# Patient Record
Sex: MALE | Race: WHITE | NOT HISPANIC OR LATINO | URBAN - METROPOLITAN AREA
[De-identification: names, ages, dates, MRNs, and addresses within clinical notes are randomized per-mention and may not be internally consistent; named-entity substitution may affect disease eponyms.]

---

## 2017-01-04 ENCOUNTER — APPOINTMENT (RX ONLY)
Dept: URBAN - METROPOLITAN AREA CLINIC 24 | Facility: CLINIC | Age: 52
Setting detail: DERMATOLOGY
End: 2017-01-04

## 2017-01-04 DIAGNOSIS — Z48.02 ENCOUNTER FOR REMOVAL OF SUTURES: ICD-10-CM

## 2017-01-04 PROCEDURE — ? OTHER

## 2017-01-04 PROCEDURE — ? SUTURE REMOVAL (GLOBAL PERIOD)

## 2017-01-04 ASSESSMENT — LOCATION ZONE DERM: LOCATION ZONE: ARM

## 2017-01-04 ASSESSMENT — LOCATION SIMPLE DESCRIPTION DERM: LOCATION SIMPLE: LEFT SHOULDER

## 2017-01-04 ASSESSMENT — LOCATION DETAILED DESCRIPTION DERM: LOCATION DETAILED: LEFT POSTERIOR SHOULDER

## 2017-01-04 NOTE — PROCEDURE: SUTURE REMOVAL (GLOBAL PERIOD)
Detail Level: Detailed
Add 34685 Cpt? (Important Note: In 2017 The Use Of 01871 Is Being Tracked By Cms To Determine Future Global Period Reimbursement For Global Periods): no

## 2017-01-04 NOTE — PROCEDURE: OTHER
Detail Level: Zone
Other (Free Text): Suture removed by assistant. I was available in the office but did not examine the patient.
Note Text (......Xxx Chief Complaint.): This diagnosis correlates with the

## 2017-01-26 ENCOUNTER — APPOINTMENT (RX ONLY)
Dept: URBAN - METROPOLITAN AREA CLINIC 24 | Facility: CLINIC | Age: 52
Setting detail: DERMATOLOGY
End: 2017-01-26

## 2017-01-26 DIAGNOSIS — L73.8 OTHER SPECIFIED FOLLICULAR DISORDERS: ICD-10-CM

## 2017-01-26 DIAGNOSIS — L82.1 OTHER SEBORRHEIC KERATOSIS: ICD-10-CM

## 2017-01-26 DIAGNOSIS — L91.8 OTHER HYPERTROPHIC DISORDERS OF THE SKIN: ICD-10-CM

## 2017-01-26 PROBLEM — E78.5 HYPERLIPIDEMIA, UNSPECIFIED: Status: ACTIVE | Noted: 2017-01-26

## 2017-01-26 PROBLEM — J30.1 ALLERGIC RHINITIS DUE TO POLLEN: Status: ACTIVE | Noted: 2017-01-26

## 2017-01-26 PROCEDURE — ? SKIN TAG REMOVAL

## 2017-01-26 PROCEDURE — 11200 RMVL SKIN TAGS UP TO&INC 15: CPT

## 2017-01-26 PROCEDURE — 99212 OFFICE O/P EST SF 10 MIN: CPT | Mod: 25

## 2017-01-26 PROCEDURE — ? OTHER

## 2017-01-26 PROCEDURE — ? COUNSELING

## 2017-01-26 ASSESSMENT — LOCATION DETAILED DESCRIPTION DERM
LOCATION DETAILED: LEFT SUPERIOR CENTRAL MALAR CHEEK
LOCATION DETAILED: LEFT SUPERIOR MEDIAL MALAR CHEEK
LOCATION DETAILED: LEFT AXILLARY VAULT
LOCATION DETAILED: LEFT INFERIOR LID MARGIN

## 2017-01-26 ASSESSMENT — LOCATION SIMPLE DESCRIPTION DERM
LOCATION SIMPLE: LEFT CHEEK
LOCATION SIMPLE: LEFT INFERIOR EYELID
LOCATION SIMPLE: LEFT AXILLARY VAULT

## 2017-01-26 ASSESSMENT — LOCATION ZONE DERM
LOCATION ZONE: EYELID
LOCATION ZONE: FACE
LOCATION ZONE: AXILLAE

## 2017-01-26 NOTE — PROCEDURE: SKIN TAG REMOVAL
Medical Necessity Clause: This procedure was medically necessary because the lesions that were treated were: rubbing on clothes
Anesthesia Volume In Cc: 0
Medical Necessity Information: It is in your best interest to select a reason for this procedure from the list below. All of these items fulfill various CMS LCD requirements except the new and changing color options.
Include Z78.9 (Other Specified Conditions Influencing Health Status) As An Associated Diagnosis?: No
Consent: Written consent obtained and the risks of skin tag removal was reviewed with the patient including but not limited to bleeding, pigmentary change, infection, pain, and remote possibility of scarring.
Detail Level: Detailed

## 2017-01-26 NOTE — PROCEDURE: OTHER
Detail Level: Zone
Note Text (......Xxx Chief Complaint.): This diagnosis correlates with the
Other (Free Text): Rec ophtho if pt desires removal since on lid margin

## 2017-04-06 ENCOUNTER — APPOINTMENT (RX ONLY)
Dept: URBAN - METROPOLITAN AREA CLINIC 24 | Facility: CLINIC | Age: 52
Setting detail: DERMATOLOGY
End: 2017-04-06

## 2017-04-06 DIAGNOSIS — Z87.2 PERSONAL HISTORY OF DISEASES OF THE SKIN AND SUBCUTANEOUS TISSUE: ICD-10-CM

## 2017-04-06 DIAGNOSIS — D22 MELANOCYTIC NEVI: ICD-10-CM
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
## 2017-04-06 DIAGNOSIS — L81.4 OTHER MELANIN HYPERPIGMENTATION: ICD-10-CM

## 2017-04-06 PROBLEM — L70.0 ACNE VULGARIS: Status: ACTIVE | Noted: 2017-04-06
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
## 2017-04-06 PROBLEM — D22.5 MELANOCYTIC NEVI OF TRUNK: Status: ACTIVE | Noted: 2017-04-06

## 2017-04-06 PROBLEM — D48.5 NEOPLASM OF UNCERTAIN BEHAVIOR OF SKIN: Status: ACTIVE | Noted: 2017-04-06

## 2017-04-06 PROCEDURE — ? COUNSELING

## 2017-04-06 PROCEDURE — ? MEDICAL PHOTOGRAPHY REVIEW

## 2017-04-06 PROCEDURE — ? OBSERVATION

## 2017-04-06 PROCEDURE — 11300 SHAVE SKIN LESION 0.5 CM/<: CPT | Mod: 76

## 2017-04-06 PROCEDURE — ? SHAVE REMOVAL

## 2017-04-06 PROCEDURE — 11300 SHAVE SKIN LESION 0.5 CM/<: CPT

## 2017-04-06 PROCEDURE — 99213 OFFICE O/P EST LOW 20 MIN: CPT | Mod: 25

## 2017-04-06 PROCEDURE — ? OTHER

## 2017-04-06 ASSESSMENT — LOCATION SIMPLE DESCRIPTION DERM
LOCATION SIMPLE: RIGHT LOWER BACK
LOCATION SIMPLE: LEFT SHOULDER
LOCATION SIMPLE: LEFT FOREARM
LOCATION SIMPLE: RIGHT SHOULDER
LOCATION SIMPLE: RIGHT UPPER BACK
LOCATION SIMPLE: LEFT CHEEK
LOCATION SIMPLE: NOSE
LOCATION SIMPLE: RIGHT FOREARM
LOCATION SIMPLE: ABDOMEN

## 2017-04-06 ASSESSMENT — LOCATION DETAILED DESCRIPTION DERM
LOCATION DETAILED: RIGHT MEDIAL UPPER BACK
LOCATION DETAILED: NASAL TIP
LOCATION DETAILED: RIGHT POSTERIOR SHOULDER
LOCATION DETAILED: EPIGASTRIC SKIN
LOCATION DETAILED: RIGHT SUPERIOR UPPER BACK
LOCATION DETAILED: LEFT PROXIMAL DORSAL FOREARM
LOCATION DETAILED: RIGHT SUPERIOR MEDIAL MIDBACK
LOCATION DETAILED: RIGHT PROXIMAL DORSAL FOREARM
LOCATION DETAILED: LEFT POSTERIOR SHOULDER
LOCATION DETAILED: LEFT SUPERIOR CENTRAL MALAR CHEEK

## 2017-04-06 ASSESSMENT — LOCATION ZONE DERM
LOCATION ZONE: TRUNK
LOCATION ZONE: NOSE
LOCATION ZONE: FACE
LOCATION ZONE: ARM

## 2017-04-06 NOTE — PROCEDURE: SHAVE REMOVAL
X Size Of Lesion In Cm (Optional): 0
Detail Level: Detailed
Notification Instructions: Patient will be notified of biopsy results. However, patient instructed to call the office if not contacted within 2 weeks.
Path Notes (To The Dermatopathologist): Please check margins.
Medical Necessity Clause: This procedure was medically necessary because the lesion that was rubs on shirt, irritated, and bleeding.
Anesthesia Volume In Cc: 0.5
Consent was obtained from the patient. The risks and benefits to therapy were discussed in detail. Specifically, the risks of infection, scarring, bleeding, prolonged wound healing, incomplete removal, allergy to anesthesia, nerve injury and recurrence were addressed. Prior to the procedure, the treatment site was clearly identified and confirmed by the patient. All components of Universal Protocol/PAUSE Rule completed.
Medical Necessity Information: It is in your best interest to select a reason for this procedure from the list below. All of these items fulfill various CMS LCD requirements except the new and changing color options.
Size Of Margin In Cm (Margins Are Not Added To Billing Dimensions): -
Render Post-Care Instructions In Note?: yes
Anesthesia Type: 1% lidocaine with 1:100,000 epinephrine and a 1:12 solution of 8.4% sodium bicarbonate
Bill For Surgical Tray: no
Biopsy Method: Personna blade
Size Of Lesion In Cm (Required): 0.4
Post-Care Instructions: I reviewed with the patient in detail post-care instructions. Patient is to keep the biopsy site dry overnight, and then apply vaseline twice daily until healed. Patient may apply hydrogen peroxide soaks to remove any crusting. Wound care sheet provided.
Accession #: [id number]
Wound Care: Vaseline
Hemostasis: Aluminum Chloride
Billing Type: Third-Party Bill

## 2017-04-06 NOTE — PROCEDURE: OTHER
Detail Level: Zone
Other (Free Text): Discussed that skin cancer can be life threatening. Pt still tanning but less frequently.
Other (Free Text): Recommended biopsy given irregular color, concern for malignancy. Pt declined biopsy today. Photo today and recheck 6 months. He will watch at home and call w changes.
Note Text (......Xxx Chief Complaint.): This diagnosis correlates with the

## 2017-04-06 NOTE — PROCEDURE: MEDICAL PHOTOGRAPHY REVIEW
Number Of Photographs Reviewed: 4
Review Findings: no new or changing lesions
Detail Level: Detailed

## 2018-02-27 PROBLEM — E29.1 HYPOGONADISM IN MALE: Status: ACTIVE | Noted: 2018-02-27

## 2018-09-20 PROBLEM — R79.89 ELEVATED LFTS: Status: ACTIVE | Noted: 2018-09-20

## 2019-02-04 PROBLEM — M19.011 GLENOHUMERAL ARTHRITIS, RIGHT: Status: ACTIVE | Noted: 2019-01-04

## 2019-02-04 PROBLEM — M19.012 OSTEOARTHRITIS OF LEFT GLENOHUMERAL JOINT: Status: ACTIVE | Noted: 2018-12-27

## 2019-02-12 ENCOUNTER — HOSPITAL ENCOUNTER (OUTPATIENT)
Dept: CT IMAGING | Age: 54
Discharge: HOME | End: 2019-02-12
Attending: FAMILY MEDICINE

## 2019-02-12 DIAGNOSIS — E78.5 HYPERLIPIDEMIA, UNSPECIFIED HYPERLIPIDEMIA TYPE: ICD-10-CM

## 2019-02-26 PROBLEM — R93.1 ELEVATED CORONARY ARTERY CALCIUM SCORE: Status: ACTIVE | Noted: 2019-02-26

## 2019-02-26 PROBLEM — R07.9 CHEST PAIN AT REST: Status: ACTIVE | Noted: 2019-02-26

## 2019-02-26 PROBLEM — I10 ESSENTIAL HYPERTENSION WITH GOAL BLOOD PRESSURE LESS THAN 130/85: Status: ACTIVE | Noted: 2019-02-26

## 2019-02-26 PROBLEM — Z82.49 FAMILY HISTORY OF MI (MYOCARDIAL INFARCTION): Status: ACTIVE | Noted: 2019-02-26

## 2019-02-26 PROBLEM — E78.2 MIXED HYPERLIPIDEMIA: Status: ACTIVE | Noted: 2019-02-26

## 2019-03-05 ENCOUNTER — APPOINTMENT (OUTPATIENT)
Dept: GENERAL RADIOLOGY | Age: 54
End: 2019-03-05
Attending: EMERGENCY MEDICINE
Payer: COMMERCIAL

## 2019-03-05 ENCOUNTER — HOSPITAL ENCOUNTER (EMERGENCY)
Age: 54
Discharge: ANOTHER HOSPITAL | End: 2019-03-05
Attending: EMERGENCY MEDICINE
Payer: COMMERCIAL

## 2019-03-05 ENCOUNTER — HOSPITAL ENCOUNTER (OUTPATIENT)
Age: 54
Setting detail: OBSERVATION
Discharge: HOME | End: 2019-03-06
Attending: INTERNAL MEDICINE | Admitting: INTERNAL MEDICINE
Payer: COMMERCIAL

## 2019-03-05 DIAGNOSIS — R07.9 CHEST PAIN, UNSPECIFIED TYPE: Primary | ICD-10-CM

## 2019-03-05 LAB
ALBUMIN SERPL-MCNC: 4.1 G/DL (ref 3.5–5)
ALBUMIN/GLOB SERPL: 1 {RATIO}
ALP SERPL-CCNC: 79 U/L (ref 50–136)
ALT SERPL-CCNC: 51 U/L (ref 12–65)
ANION GAP SERPL CALC-SCNC: 8 MMOL/L
AST SERPL-CCNC: 45 U/L (ref 15–37)
BASOPHILS # BLD: 0 K/UL (ref 0–0.2)
BASOPHILS NFR BLD: 1 % (ref 0–2)
BILIRUB SERPL-MCNC: 0.5 MG/DL (ref 0.2–1.1)
BUN SERPL-MCNC: 13 MG/DL (ref 6–23)
CALCIUM SERPL-MCNC: 9.8 MG/DL (ref 8.3–10.4)
CHLORIDE SERPL-SCNC: 100 MMOL/L (ref 98–107)
CO2 SERPL-SCNC: 28 MMOL/L (ref 21–32)
CREAT SERPL-MCNC: 1.06 MG/DL (ref 0.8–1.5)
D DIMER PPP FEU-MCNC: 0.3 UG/ML(FEU)
DIFFERENTIAL METHOD BLD: ABNORMAL
EOSINOPHIL # BLD: 0.2 K/UL (ref 0–0.8)
EOSINOPHIL NFR BLD: 2 % (ref 0.5–7.8)
ERYTHROCYTE [DISTWIDTH] IN BLOOD BY AUTOMATED COUNT: 14.7 % (ref 11.9–14.6)
GLOBULIN SER CALC-MCNC: 4 G/DL (ref 2.3–3.5)
GLUCOSE SERPL-MCNC: 100 MG/DL (ref 65–100)
HCT VFR BLD AUTO: 50.9 % (ref 41.1–50.3)
HGB BLD-MCNC: 16.3 G/DL (ref 13.6–17.2)
IMM GRANULOCYTES # BLD AUTO: 0 K/UL (ref 0–0.5)
IMM GRANULOCYTES NFR BLD AUTO: 0 % (ref 0–5)
LIPASE SERPL-CCNC: 167 U/L (ref 73–393)
LYMPHOCYTES # BLD: 1.6 K/UL (ref 0.5–4.6)
LYMPHOCYTES NFR BLD: 21 % (ref 13–44)
MCH RBC QN AUTO: 26.5 PG (ref 26.1–32.9)
MCHC RBC AUTO-ENTMCNC: 32 G/DL (ref 31.4–35)
MCV RBC AUTO: 82.6 FL (ref 79.6–97.8)
MONOCYTES # BLD: 0.5 K/UL (ref 0.1–1.3)
MONOCYTES NFR BLD: 6 % (ref 4–12)
NEUTS SEG # BLD: 5.2 K/UL (ref 1.7–8.2)
NEUTS SEG NFR BLD: 70 % (ref 43–78)
NRBC # BLD: 0 K/UL (ref 0–0.2)
PLATELET # BLD AUTO: 251 K/UL (ref 150–450)
PMV BLD AUTO: 8.9 FL (ref 9.4–12.3)
POTASSIUM SERPL-SCNC: 3.9 MMOL/L (ref 3.5–5.1)
PROT SERPL-MCNC: 8.1 G/DL
RBC # BLD AUTO: 6.16 M/UL (ref 4.23–5.6)
SODIUM SERPL-SCNC: 136 MMOL/L (ref 136–145)
TROPONIN I BLD-MCNC: 0 NG/ML (ref 0.02–0.05)
TROPONIN I BLD-MCNC: 0 NG/ML (ref 0.02–0.05)
TROPONIN I SERPL-MCNC: <0.02 NG/ML (ref 0.02–0.05)
WBC # BLD AUTO: 7.4 K/UL (ref 4.3–11.1)

## 2019-03-05 PROCEDURE — 71046 X-RAY EXAM CHEST 2 VIEWS: CPT

## 2019-03-05 PROCEDURE — 74011250637 HC RX REV CODE- 250/637: Performed by: EMERGENCY MEDICINE

## 2019-03-05 PROCEDURE — 65270000029 HC RM PRIVATE

## 2019-03-05 PROCEDURE — 93005 ELECTROCARDIOGRAM TRACING: CPT | Performed by: EMERGENCY MEDICINE

## 2019-03-05 PROCEDURE — 99285 EMERGENCY DEPT VISIT HI MDM: CPT | Performed by: EMERGENCY MEDICINE

## 2019-03-05 PROCEDURE — 99218 HC RM OBSERVATION: CPT

## 2019-03-05 PROCEDURE — 84484 ASSAY OF TROPONIN QUANT: CPT

## 2019-03-05 PROCEDURE — 85025 COMPLETE CBC W/AUTO DIFF WBC: CPT

## 2019-03-05 PROCEDURE — 83690 ASSAY OF LIPASE: CPT

## 2019-03-05 PROCEDURE — 80053 COMPREHEN METABOLIC PANEL: CPT

## 2019-03-05 PROCEDURE — 85379 FIBRIN DEGRADATION QUANT: CPT

## 2019-03-05 RX ORDER — NITROGLYCERIN 0.4 MG/1
0.4 TABLET SUBLINGUAL
Status: DISCONTINUED | OUTPATIENT
Start: 2019-03-05 | End: 2019-03-05 | Stop reason: HOSPADM

## 2019-03-05 RX ORDER — GUAIFENESIN 100 MG/5ML
324 LIQUID (ML) ORAL
Status: DISCONTINUED | OUTPATIENT
Start: 2019-03-05 | End: 2019-03-05 | Stop reason: HOSPADM

## 2019-03-05 RX ORDER — ASPIRIN 325 MG
325 TABLET ORAL DAILY
COMMUNITY
End: 2019-09-05

## 2019-03-05 RX ORDER — MORPHINE SULFATE 4 MG/ML
2 INJECTION, SOLUTION INTRAMUSCULAR; INTRAVENOUS
Status: DISCONTINUED | OUTPATIENT
Start: 2019-03-05 | End: 2019-03-05

## 2019-03-05 RX ADMIN — NITROGLYCERIN 0.4 MG: 0.4 TABLET, ORALLY DISINTEGRATING SUBLINGUAL at 19:19

## 2019-03-06 VITALS
BODY MASS INDEX: 31.15 KG/M2 | HEIGHT: 72 IN | SYSTOLIC BLOOD PRESSURE: 169 MMHG | RESPIRATION RATE: 20 BRPM | TEMPERATURE: 98.5 F | WEIGHT: 230 LBS | OXYGEN SATURATION: 100 % | HEART RATE: 60 BPM | DIASTOLIC BLOOD PRESSURE: 99 MMHG

## 2019-03-06 VITALS
TEMPERATURE: 97.7 F | SYSTOLIC BLOOD PRESSURE: 122 MMHG | DIASTOLIC BLOOD PRESSURE: 75 MMHG | RESPIRATION RATE: 17 BRPM | HEART RATE: 53 BPM | OXYGEN SATURATION: 95 %

## 2019-03-06 PROBLEM — F41.9 ANXIETY: Status: ACTIVE | Noted: 2019-03-06

## 2019-03-06 PROBLEM — R07.9 CHEST PAIN: Status: ACTIVE | Noted: 2019-03-06

## 2019-03-06 LAB
ATRIAL RATE: 62 BPM
CALCULATED P AXIS, ECG09: 62 DEGREES
CALCULATED R AXIS, ECG10: 5 DEGREES
CALCULATED T AXIS, ECG11: 42 DEGREES
DIAGNOSIS, 93000: NORMAL
P-R INTERVAL, ECG05: 216 MS
Q-T INTERVAL, ECG07: 404 MS
QRS DURATION, ECG06: 98 MS
QTC CALCULATION (BEZET), ECG08: 410 MS
TROPONIN I SERPL-MCNC: <0.02 NG/ML (ref 0.02–0.05)
VENTRICULAR RATE, ECG03: 62 BPM

## 2019-03-06 PROCEDURE — 74011250636 HC RX REV CODE- 250/636: Performed by: NURSE PRACTITIONER

## 2019-03-06 PROCEDURE — 93306 TTE W/DOPPLER COMPLETE: CPT

## 2019-03-06 PROCEDURE — 74011250637 HC RX REV CODE- 250/637: Performed by: NURSE PRACTITIONER

## 2019-03-06 PROCEDURE — 99218 HC RM OBSERVATION: CPT

## 2019-03-06 PROCEDURE — 36415 COLL VENOUS BLD VENIPUNCTURE: CPT

## 2019-03-06 PROCEDURE — 93351 STRESS TTE COMPLETE: CPT

## 2019-03-06 PROCEDURE — 77030020263 HC SOL INJ SOD CL0.9% LFCR 1000ML

## 2019-03-06 PROCEDURE — 84484 ASSAY OF TROPONIN QUANT: CPT

## 2019-03-06 RX ORDER — ALPRAZOLAM 0.5 MG/1
2 TABLET ORAL ONCE
Status: COMPLETED | OUTPATIENT
Start: 2019-03-06 | End: 2019-03-06

## 2019-03-06 RX ORDER — ASPIRIN 325 MG
325 TABLET ORAL ONCE
Status: COMPLETED | OUTPATIENT
Start: 2019-03-06 | End: 2019-03-06

## 2019-03-06 RX ORDER — MORPHINE SULFATE 2 MG/ML
2 INJECTION, SOLUTION INTRAMUSCULAR; INTRAVENOUS
Status: DISCONTINUED | OUTPATIENT
Start: 2019-03-06 | End: 2019-03-06 | Stop reason: HOSPADM

## 2019-03-06 RX ORDER — ALPRAZOLAM 0.5 MG/1
1 TABLET ORAL
Status: DISCONTINUED | OUTPATIENT
Start: 2019-03-06 | End: 2019-03-06 | Stop reason: HOSPADM

## 2019-03-06 RX ORDER — SODIUM CHLORIDE 9 MG/ML
75 INJECTION, SOLUTION INTRAVENOUS CONTINUOUS
Status: DISCONTINUED | OUTPATIENT
Start: 2019-03-06 | End: 2019-03-06 | Stop reason: HOSPADM

## 2019-03-06 RX ORDER — ACETAMINOPHEN 325 MG/1
650 TABLET ORAL
Status: DISCONTINUED | OUTPATIENT
Start: 2019-03-06 | End: 2019-03-06 | Stop reason: HOSPADM

## 2019-03-06 RX ORDER — NITROGLYCERIN 0.4 MG/1
0.4 TABLET SUBLINGUAL
Status: DISCONTINUED | OUTPATIENT
Start: 2019-03-06 | End: 2019-03-06 | Stop reason: HOSPADM

## 2019-03-06 RX ORDER — ALPRAZOLAM 0.5 MG/1
1 TABLET ORAL
Status: DISCONTINUED | OUTPATIENT
Start: 2019-03-06 | End: 2019-03-06

## 2019-03-06 RX ORDER — METOPROLOL TARTRATE 25 MG/1
25 TABLET, FILM COATED ORAL EVERY 12 HOURS
Status: DISCONTINUED | OUTPATIENT
Start: 2019-03-06 | End: 2019-03-06 | Stop reason: HOSPADM

## 2019-03-06 RX ORDER — SODIUM CHLORIDE 0.9 % (FLUSH) 0.9 %
5-40 SYRINGE (ML) INJECTION AS NEEDED
Status: DISCONTINUED | OUTPATIENT
Start: 2019-03-06 | End: 2019-03-06 | Stop reason: HOSPADM

## 2019-03-06 RX ORDER — HYDROCODONE BITARTRATE AND ACETAMINOPHEN 5; 325 MG/1; MG/1
1 TABLET ORAL
Status: DISCONTINUED | OUTPATIENT
Start: 2019-03-06 | End: 2019-03-06 | Stop reason: HOSPADM

## 2019-03-06 RX ADMIN — NITROGLYCERIN 1 INCH: 20 OINTMENT TOPICAL at 01:10

## 2019-03-06 RX ADMIN — ASPIRIN 325 MG: 325 TABLET ORAL at 08:38

## 2019-03-06 RX ADMIN — ALPRAZOLAM 1 MG: 0.5 TABLET ORAL at 08:38

## 2019-03-06 RX ADMIN — NITROGLYCERIN 1 INCH: 20 OINTMENT TOPICAL at 06:14

## 2019-03-06 RX ADMIN — SODIUM CHLORIDE 75 ML/HR: 900 INJECTION, SOLUTION INTRAVENOUS at 04:00

## 2019-03-06 RX ADMIN — ALPRAZOLAM 2 MG: 0.5 TABLET ORAL at 01:09

## 2019-03-06 RX ADMIN — METOPROLOL TARTRATE 25 MG: 25 TABLET ORAL at 08:39

## 2019-03-06 NOTE — ED NOTES
Pt c/o intermittent chest pain for over a year, it lasts 5-6 minutes and then resolves ,pain free on arrival and at this time

## 2019-03-06 NOTE — ED PROVIDER NOTES
Patient is a 53 yo male who presents with chest pain. States pain located in the center of his chest and radiates to his right jaw. States intermittent pain that is present today for the past 2-3 hours. States history of elevated calcium score with stress test scheduled for two weeks from now. States nausea with pain but no vomiting. No fevers or chills, no abdominal pain, no further complaints. Past Medical History:   Diagnosis Date    Chronic cough 10/2/2014    Family history of aortic aneurysm 10/2/2014    Glaucoma 2018    Hyperlipidemia     Paroxysmal tachycardia (HCC)     Post-nasal drip 10/2/2014    Varicose veins of other specified sites        Past Surgical History:   Procedure Laterality Date    HX TONSILLECTOMY           Family History:   Problem Relation Age of Onset    Bleeding Prob Father        Social History     Socioeconomic History    Marital status:      Spouse name: Not on file    Number of children: Not on file    Years of education: Not on file    Highest education level: Not on file   Social Needs    Financial resource strain: Not on file    Food insecurity - worry: Not on file    Food insecurity - inability: Not on file   [employer] needs - medical: Not on file   [employer] needs - non-medical: Not on file   Occupational History    Not on file   Tobacco Use    Smoking status: Never Smoker    Smokeless tobacco: Never Used   Substance and Sexual Activity    Alcohol use: Yes     Alcohol/week: 1.8 - 3.0 oz     Types: 3 - 5 Shots of liquor per week    Drug use: No    Sexual activity: Yes     Partners: Female   Other Topics Concern    Not on file   Social History Narrative    Not on file         ALLERGIES: Gadobutrol    Review of Systems   Constitutional: Negative for chills and fever. HENT: Negative for rhinorrhea and sore throat. Eyes: Negative for visual disturbance. Respiratory: Negative for cough and shortness of breath. Cardiovascular: Positive for chest pain. Negative for leg swelling. Gastrointestinal: Negative for abdominal pain, diarrhea, nausea and vomiting. Genitourinary: Negative for dysuria. Musculoskeletal: Negative for back pain and neck pain. Skin: Negative for rash. Neurological: Negative for weakness and headaches. Psychiatric/Behavioral: The patient is not nervous/anxious. Vitals:    03/05/19 1907   BP: (!) 184/104   Pulse: 66   Resp: 20   Temp: 98 °F (36.7 °C)   SpO2: 97%   Weight: 104.3 kg (230 lb)   Height: 6' (1.829 m)            Physical Exam   Constitutional: He is oriented to person, place, and time. He appears well-developed and well-nourished. HENT:   Head: Normocephalic. Right Ear: External ear normal.   Left Ear: External ear normal.   Eyes: Conjunctivae and EOM are normal. Pupils are equal, round, and reactive to light. Neck: Normal range of motion. Neck supple. No tracheal deviation present. Cardiovascular: Normal rate, regular rhythm, normal heart sounds and intact distal pulses. No murmur heard. Pulmonary/Chest: Effort normal and breath sounds normal. No respiratory distress. Abdominal: Soft. There is no tenderness. Musculoskeletal: Normal range of motion. Neurological: He is alert and oriented to person, place, and time. No cranial nerve deficit. Skin: No rash noted. Nursing note and vitals reviewed.        MDM  Number of Diagnoses or Management Options  Chest pain, unspecified type: new and requires workup     Amount and/or Complexity of Data Reviewed  Clinical lab tests: ordered and reviewed  Tests in the radiology section of CPT®: ordered and reviewed  Tests in the medicine section of CPT®: ordered and reviewed  Review and summarize past medical records: yes    Risk of Complications, Morbidity, and/or Mortality  Presenting problems: high  Diagnostic procedures: high  Management options: high    Patient Progress  Patient progress: stable Procedures    Recent Results (from the past 12 hour(s))   EKG, 12 LEAD, INITIAL    Collection Time: 03/05/19  6:52 PM   Result Value Ref Range    Ventricular Rate 62 BPM    Atrial Rate 62 BPM    P-R Interval 216 ms    QRS Duration 98 ms    Q-T Interval 404 ms    QTC Calculation (Bezet) 410 ms    Calculated P Axis 62 degrees    Calculated R Axis 5 degrees    Calculated T Axis 42 degrees    Diagnosis       !! AGE AND GENDER SPECIFIC ECG ANALYSIS !! Sinus rhythm with 1st degree A-V block  Cannot rule out Anterior infarct , age undetermined  Abnormal ECG  No previous ECGs available     POC TROPONIN-I    Collection Time: 03/05/19  7:03 PM   Result Value Ref Range    Troponin-I (POC) 0 (L) 0.02 - 0.05 ng/ml   CBC WITH AUTOMATED DIFF    Collection Time: 03/05/19  7:04 PM   Result Value Ref Range    WBC 7.4 4.3 - 11.1 K/uL    RBC 6.16 (H) 4.23 - 5.6 M/uL    HGB 16.3 13.6 - 17.2 g/dL    HCT 50.9 (H) 41.1 - 50.3 %    MCV 82.6 79.6 - 97.8 FL    MCH 26.5 26.1 - 32.9 PG    MCHC 32.0 31.4 - 35.0 g/dL    RDW 14.7 (H) 11.9 - 14.6 %    PLATELET 516 183 - 048 K/uL    MPV 8.9 (L) 9.4 - 12.3 FL    ABSOLUTE NRBC 0.00 0.0 - 0.2 K/uL    DF AUTOMATED      NEUTROPHILS 70 43 - 78 %    LYMPHOCYTES 21 13 - 44 %    MONOCYTES 6 4.0 - 12.0 %    EOSINOPHILS 2 0.5 - 7.8 %    BASOPHILS 1 0.0 - 2.0 %    IMMATURE GRANULOCYTES 0 0.0 - 5.0 %    ABS. NEUTROPHILS 5.2 1.7 - 8.2 K/UL    ABS. LYMPHOCYTES 1.6 0.5 - 4.6 K/UL    ABS. MONOCYTES 0.5 0.1 - 1.3 K/UL    ABS. EOSINOPHILS 0.2 0.0 - 0.8 K/UL    ABS. BASOPHILS 0.0 0.0 - 0.2 K/UL    ABS. IMM.  GRANS. 0.0 0.0 - 0.5 K/UL   METABOLIC PANEL, COMPREHENSIVE    Collection Time: 03/05/19  7:04 PM   Result Value Ref Range    Sodium 136 136 - 145 mmol/L    Potassium 3.9 3.5 - 5.1 mmol/L    Chloride 100 98 - 107 mmol/L    CO2 28 21 - 32 mmol/L    Anion gap 8 mmol/L    Glucose 100 65 - 100 mg/dL    BUN 13 6 - 23 MG/DL    Creatinine 1.06 0.8 - 1.5 MG/DL    GFR est AA >60 >60 ml/min/1.73m2    GFR est non-AA >60 ml/min/1.73m2    Calcium 9.8 8.3 - 10.4 MG/DL    Bilirubin, total 0.5 0.2 - 1.1 MG/DL    ALT (SGPT) 51 12 - 65 U/L    AST (SGOT) 45 (H) 15 - 37 U/L    Alk. phosphatase 79 50 - 136 U/L    Protein, total 8.1 g/dL    Albumin 4.1 3.5 - 5.0 g/dL    Globulin 4.0 (H) 2.3 - 3.5 g/dL    A-G Ratio 1.0     TROPONIN I    Collection Time: 03/05/19  7:04 PM   Result Value Ref Range    Troponin-I, Qt. <0.02 (L) 0.02 - 0.05 NG/ML   LIPASE    Collection Time: 03/05/19  7:04 PM   Result Value Ref Range    Lipase 167 73 - 393 U/L   D DIMER    Collection Time: 03/05/19  7:04 PM   Result Value Ref Range    D DIMER 0.30 <0.56 ug/ml(FEU)     Xr Chest Pa Lat    Result Date: 3/5/2019  Two view chest History: Acute onset moderate chest pain and dizziness Comparison: None Findings: The heart is borderline enlarged. The lungs and pleural spaces are clear. The pulmonary vascularity is within normal limits. The visualized osseous structures are unremarkable. Impression: Borderline cardiomegaly silhouette with clear lungs     Ct Heart W/o Cont With Calcium    Result Date: 2/12/2019  CT calcium scoring study INDICATION:  High cholesterol COMPARISON: None. Correlation is made to the routine chest CT 10/09/2014 Technique: Automated exposure Control was used. Multiple gated axial CT images were obtained through the chest without contrast. FINDINGS: The calcium score is 326. This places the patient in the 80th percentile rank. Limited visualization of the thorax:  The partially visualized lungs are clear. No masses or infiltrates are seen. There is no visualized adenopathy. IMPRESSION: Calcium score is 326.       51 Yo male with chest pain:    HEART score 5 for history of HTN/hyperlipidemia, known elevated calcium score and also for age and HPI. D-dimer negative so low suspicion of further acute process such as PE or even dissection. Patient discussed with cardiology for admission and further workup.

## 2019-03-06 NOTE — ED NOTES
[facility] here to transport patient to [facility] room #[id number]. Report given to paramedic.

## 2019-03-06 NOTE — PROGRESS NOTES
3/6/2019 7:06 AM    Admit Date: 3/5/2019    Admit Diagnosis: Chest pain [R07.9]      Subjective:    Patient admitted with atypical angina pattern. . Does not want cath.  Stress echo should adequately risk stratify    Objective:      Visit Vitals  /71   Pulse 70   Temp 98.1 °F (36.7 °C)   Resp 18   SpO2 96%       ROS:  General ROS: negative for - chills  Hematological and Lymphatic ROS: negative for - blood clots or jaundice  Respiratory ROS: no cough, shortness of breath, or wheezing  Cardiovascular ROS: no chest pain or dyspnea on exertion  Gastrointestinal ROS: no abdominal pain, change in bowel habits, or black or bloody stools  Neurological ROS: no TIA or stroke symptoms    Physical Exam:    Physical Examination: General appearance - alert, well appearing, and in no distress  Mental status - alert, oriented to person, place, and time  Eyes - pupils equal and reactive, extraocular eye movements intact  Neck/lymph - supple, no significant adenopathy  Chest/CV - clear to auscultation, no wheezes, rales or rhonchi, symmetric air entry  Heart - normal rate, regular rhythm, normal S1, S2, no murmurs, rubs, clicks or gallops  Abdomen/GI - soft, nontender, nondistended, no masses or organomegaly  Musculoskeletal - no joint tenderness, deformity or swelling  Extremities - peripheral pulses normal, no pedal edema, no clubbing or cyanosis  Skin - normal coloration and turgor, no rashes, no suspicious skin lesions noted    Current Facility-Administered Medications   Medication Dose Route Frequency    aspirin tablet 325 mg  325 mg Oral ONCE    ALPRAZolam (XANAX) tablet 1 mg  1 mg Oral BID PRN    sodium chloride (NS) flush 5-40 mL  5-40 mL IntraVENous PRN    nitroglycerin (NITROBID) 2 % ointment 1 Inch  1 Inch Topical Q6H    nitroglycerin (NITROSTAT) tablet 0.4 mg  0.4 mg SubLINGual Q5MIN PRN    acetaminophen (TYLENOL) tablet 650 mg  650 mg Oral Q4H PRN    HYDROcodone-acetaminophen (NORCO) 5-325 mg per tablet 1 Tab  1 Tab Oral Q4H PRN    morphine injection 2 mg  2 mg IntraVENous Q4H PRN    metoprolol tartrate (LOPRESSOR) tablet 25 mg  25 mg Oral Q12H    0.9% sodium chloride infusion  75 mL/hr IntraVENous CONTINUOUS    ezetimibe/simvastatin (VYTORIN) 10/40 mg   Oral QPM       Data Review:   @LABRCNT(Na,K,BUN,CREA,WBC,HGB,HCT,PLT,INR,TRP,TCHOL*,Triglyceride*,LDL*,LDLCPOC HDL*,HDL])@    TELEMETRY: nsr    Assessment/Plan:     Principal Problem:    Chest pain (3/6/2019) stress echo    Active Problems:    Hyperlipidemia (10/2/2014)The current medical regimen is effective;  continue present plan and medications.         Essential hypertension with goal blood pressure less than 130/85 (2/26/2019) monitor      Anxiety (3/6/2019)          Harleen Drummond MD

## 2019-03-06 NOTE — DISCHARGE SUMMARY
[facility] Cardiology Discharge Summary     Patient ID:  Mitra Orr  [id number]  52 y.o.  1965    Admit date: 3/5/2019    Discharge date and time:  3-6-2019    Admitting Physician: Jacy Cassidy MD     Discharge Physician: Bard Marci PA-C/Dr. Lemus    Admission Diagnoses: Chest pain [R07.9]    Discharge Diagnoses:   Patient Active Problem List    Diagnosis Date Noted    Chest pain 03/06/2019    Anxiety 03/06/2019    Elevated coronary artery calcium score 02/26/2019    Chest pain at rest 02/26/2019    Mixed hyperlipidemia 02/26/2019    Essential hypertension with goal blood pressure less than 130/85 02/26/2019    Family history of MI (myocardial infarction) 02/26/2019    Glenohumeral arthritis, right 01/04/2019    Osteoarthritis of left glenohumeral joint 12/27/2018    BMI 32.0-32.9,adult 09/20/2018    Elevated LFTs 09/20/2018    Hypogonadism in male 02/27/2018    Chronic cough 10/02/2014    Post-nasal drip 10/02/2014    Family history of aortic aneurysm 10/02/2014    Hyperlipidemia 10/02/2014       Cardiology Procedures this admission:  Stress Echo  Consults: None    Hospital Course: Pt is a 52 y.o. WM with h/o anxiety, HTN and HLD who presented to the ER with c/o chest pain. The intermittent chest pain was located in the center of his chest with radiation to right jaw. He sees Dr. Annika Ward in the office and was seen last week for abnormal Ca score and ongoing chest pain. The chest pain has been on and off for over a year. The episodes are sporadic and he may go 2 months without having pain. Patient states when he has an episode of chest pain it is severe and can last for a few minutes up to 2 - 3 hours. He was scheduled for a nuclear stress test in the office for next week. In the ED his BP was 184/104. EKG showed SR with NSST changes. Labs were unremarkable with troponin level negative x 2.    In the ED he received morphine,  mg, and SL NTG x 1 with improvement of symptoms. He was transferred to [facility] for further treatment. On arrival to [facility], his BP was still elevated at 164/94. On exam, he was CP free but appeared very anxious. He was admitted with plan for LHC to evaluate for ischemia. The following morning, he told Dr. Smith Husbands he didn't want a LHC and a stress echo was planned. He had stress echo w/o complication that showed normal perfusion and Dr. Luis Graham felt Pt was stable for DC home to f/u PCP and Dr. Gustavo Harrington. Pt tolerated the procedure well and was taken to the telemetry floor for recovery. Pt was up feeling well without any complaints of CP, SOB or palpitations. Pt's labs were WNL. Pt was seen and examined by Dr. Luis Graham and determined stable and ready for discharge. DISPOSITION: The patient is being discharged home in stable condition on a low saturated fat, low cholesterol and low salt diet. Pt is instructed to advance activities as tolerated limited to fatigue or shortness of breath. Pt is instructed to call office or return to ER for immediate evaluation of any shortness of breath or chest pain. Follow up with [facility] Cardiology Dr. Gustavo Harrington in 2-4 weeks  Follow up with PCP Dr. Emma Khan in 1-2 weeks    Discharge Exam:   Visit Vitals  /75 (BP 1 Location: Left arm, BP Patient Position: At rest)   Pulse (!) 53   Temp 97.7 °F (36.5 °C)   Resp 17   SpO2 95%    Pt has been seen by Dr. Luis Graham: see his progress note for exam details. Recent Results (from the past 24 hour(s))   EKG, 12 LEAD, INITIAL    Collection Time: 03/05/19  6:52 PM   Result Value Ref Range    Ventricular Rate 62 BPM    Atrial Rate 62 BPM    P-R Interval 216 ms    QRS Duration 98 ms    Q-T Interval 404 ms    QTC Calculation (Bezet) 410 ms    Calculated P Axis 62 degrees    Calculated R Axis 5 degrees    Calculated T Axis 42 degrees    Diagnosis       !! AGE AND GENDER SPECIFIC ECG ANALYSIS !!   Sinus rhythm with 1st degree A-V block  Cannot rule out Anterior infarct , age undetermined  Abnormal ECG  No previous ECGs available  Confirmed by CHETAN TO (), Melanie Kaur ([id number]) on 3/6/2019 6:13:42 AM     POC TROPONIN-I    Collection Time: 03/05/19  7:03 PM   Result Value Ref Range    Troponin-I (POC) 0 (L) 0.02 - 0.05 ng/ml   CBC WITH AUTOMATED DIFF    Collection Time: 03/05/19  7:04 PM   Result Value Ref Range    WBC 7.4 4.3 - 11.1 K/uL    RBC 6.16 (H) 4.23 - 5.6 M/uL    HGB 16.3 13.6 - 17.2 g/dL    HCT 50.9 (H) 41.1 - 50.3 %    MCV 82.6 79.6 - 97.8 FL    MCH 26.5 26.1 - 32.9 PG    MCHC 32.0 31.4 - 35.0 g/dL    RDW 14.7 (H) 11.9 - 14.6 %    PLATELET 978 022 - 331 K/uL    MPV 8.9 (L) 9.4 - 12.3 FL    ABSOLUTE NRBC 0.00 0.0 - 0.2 K/uL    DF AUTOMATED      NEUTROPHILS 70 43 - 78 %    LYMPHOCYTES 21 13 - 44 %    MONOCYTES 6 4.0 - 12.0 %    EOSINOPHILS 2 0.5 - 7.8 %    BASOPHILS 1 0.0 - 2.0 %    IMMATURE GRANULOCYTES 0 0.0 - 5.0 %    ABS. NEUTROPHILS 5.2 1.7 - 8.2 K/UL    ABS. LYMPHOCYTES 1.6 0.5 - 4.6 K/UL    ABS. MONOCYTES 0.5 0.1 - 1.3 K/UL    ABS. EOSINOPHILS 0.2 0.0 - 0.8 K/UL    ABS. BASOPHILS 0.0 0.0 - 0.2 K/UL    ABS. IMM. GRANS. 0.0 0.0 - 0.5 K/UL   METABOLIC PANEL, COMPREHENSIVE    Collection Time: 03/05/19  7:04 PM   Result Value Ref Range    Sodium 136 136 - 145 mmol/L    Potassium 3.9 3.5 - 5.1 mmol/L    Chloride 100 98 - 107 mmol/L    CO2 28 21 - 32 mmol/L    Anion gap 8 mmol/L    Glucose 100 65 - 100 mg/dL    BUN 13 6 - 23 MG/DL    Creatinine 1.06 0.8 - 1.5 MG/DL    GFR est AA >60 >60 ml/min/1.73m2    GFR est non-AA >60 ml/min/1.73m2    Calcium 9.8 8.3 - 10.4 MG/DL    Bilirubin, total 0.5 0.2 - 1.1 MG/DL    ALT (SGPT) 51 12 - 65 U/L    AST (SGOT) 45 (H) 15 - 37 U/L    Alk.  phosphatase 79 50 - 136 U/L    Protein, total 8.1 g/dL    Albumin 4.1 3.5 - 5.0 g/dL    Globulin 4.0 (H) 2.3 - 3.5 g/dL    A-G Ratio 1.0     TROPONIN I    Collection Time: 03/05/19  7:04 PM   Result Value Ref Range    Troponin-I, Qt. <0.02 (L) 0.02 - 0.05 NG/ML   LIPASE    Collection Time: 03/05/19  7:04 PM Result Value Ref Range    Lipase 167 73 - 393 U/L   D DIMER    Collection Time: 03/05/19  7:04 PM   Result Value Ref Range    D DIMER 0.30 <0.56 ug/ml(FEU)   POC TROPONIN-I    Collection Time: 03/05/19 10:04 PM   Result Value Ref Range    Troponin-I (POC) 0 (L) 0.02 - 0.05 ng/ml   TROPONIN I    Collection Time: 03/06/19  2:31 AM   Result Value Ref Range    Troponin-I, Qt. <0.02 (L) 0.02 - 0.05 NG/ML         Patient Instructions:   Current Discharge Medication List      CONTINUE these medications which have NOT CHANGED    Details   aspirin (ASPIRIN) 325 mg tablet Take 325 mg by mouth daily. vitamin k 100 mcg tablet Take 200 mcg by mouth daily. testosterone (ANDROGEL) 20.25 mg/1.25 gram (1.62 %) gel Apply 81 mg to affected area daily. Max Daily Amount: 81 mg.  Qty: 1 Bottle, Refills: 3    Associated Diagnoses: Hypogonadism in male      ezetimibe-simvastatin (VYTORIN 10/40) 10-40 mg per tablet Take 1 Tab by mouth daily. Qty: 90 Tab, Refills: 3    Associated Diagnoses: Hyperlipidemia, unspecified hyperlipidemia type      tadalafil (CIALIS) 20 mg tablet Take 1 Tab by mouth as needed. Qty: 30 Tab, Refills: 3      omega-3 acid ethyl esters (LOVAZA) 1 gram capsule Take 2 Caps by mouth two (2) times daily (with meals). Qty: 360 Cap, Refills: 3      mometasone (NASONEX) 50 mcg/actuation nasal spray 2 Sprays by Both Nostrils route daily. Qty: 1 Container, Refills: 3      anastrozole (ARIMIDEX) 1 mg tablet Take 1 mg by mouth two (2) times a week. Sunday and Thursday      ALPRAZolam (XANAX) 1 mg tablet Take 1 Tab by mouth daily as needed for Anxiety. Qty: 30 Tab, Refills: 0    Associated Diagnoses: Anxiety      cholecalciferol, vitamin d3, (VITAMIN D3) 400 unit cap Take  by mouth.                SignedNerniki Borrero PA-C  3/6/2019  12:37 PM

## 2019-03-06 NOTE — DISCHARGE INSTRUCTIONS
DISCHARGE SUMMARY from Nurse    PATIENT INSTRUCTIONS:    After general anesthesia or intravenous sedation, for 24 hours or while taking prescription Narcotics:  · Limit your activities  · Do not drive and operate hazardous machinery  · Do not make important personal or business decisions  · Do  not drink alcoholic beverages  · If you have not urinated within 8 hours after discharge, please contact your surgeon on call. Report the following to your surgeon:  · Excessive pain, swelling, redness or odor of or around the surgical area  · Temperature over 100.5  · Nausea and vomiting lasting longer than 4 hours or if unable to take medications  · Any signs of decreased circulation or nerve impairment to extremity: change in color, persistent  numbness, tingling, coldness or increase pain  · Any questions    What to do at Home:  Recommended activity: Activity as tolerated. If you experience any of the following symptoms: chest pain not relieved by medication, please follow up with your MD.    *  Please give a list of your current medications to your Primary Care Provider. *  Please update this list whenever your medications are discontinued, doses are      changed, or new medications (including over-the-counter products) are added. *  Please carry medication information at all times in case of emergency situations. These are general instructions for a healthy lifestyle:    No smoking/ No tobacco products/ Avoid exposure to second hand smoke  Surgeon General's Warning:  Quitting smoking now greatly reduces serious risk to your health.     Obesity, smoking, and sedentary lifestyle greatly increases your risk for illness    A healthy diet, regular physical exercise & weight monitoring are important for maintaining a healthy lifestyle    You may be retaining fluid if you have a history of heart failure or if you experience any of the following symptoms:  Weight gain of 3 pounds or more overnight or 5 pounds in a week, increased swelling in our hands or feet or shortness of breath while lying flat in bed. Please call your doctor as soon as you notice any of these symptoms; do not wait until your next office visit. Recognize signs and symptoms of STROKE:    F-face looks uneven    A-arms unable to move or move unevenly    S-speech slurred or non-existent    T-time-call 911 as soon as signs and symptoms begin-DO NOT go       Back to bed or wait to see if you get better-TIME IS BRAIN. Warning Signs of HEART ATTACK     Call 911 if you have these symptoms:   Chest discomfort. Most heart attacks involve discomfort in the center of the chest that lasts more than a few minutes, or that goes away and comes back. It can feel like uncomfortable pressure, squeezing, fullness, or pain.  Discomfort in other areas of the upper body. Symptoms can include pain or discomfort in one or both arms, the back, neck, jaw, or stomach.  Shortness of breath with or without chest discomfort.  Other signs may include breaking out in a cold sweat, nausea, or lightheadedness. Don't wait more than five minutes to call 911 - MINUTES MATTER! Fast action can save your life. Calling 911 is almost always the fastest way to get lifesaving treatment. Emergency Medical Services staff can begin treatment when they arrive -- up to an hour sooner than if someone gets to the hospital by car. The discharge information has been reviewed with the patient. The patient verbalized understanding. Discharge medications reviewed with the patient and appropriate educational materials and side effects teaching were provided. ___________________________________________________________________________________________________________________________________     Chest Pain: Care Instructions  Your Care Instructions    There are many things that can cause chest pain. Some are not serious and will get better on their own in a few days.  But some kinds of chest pain need more testing and treatment. Your doctor may have recommended a follow-up visit in the next 8 to 12 hours. If you are not getting better, you may need more tests or treatment. Even though your doctor has released you, you still need to watch for any problems. The doctor carefully checked you, but sometimes problems can develop later. If you have new symptoms or if your symptoms do not get better, get medical care right away. If you have worse or different chest pain or pressure that lasts more than 5 minutes or you passed out (lost consciousness), call 911 or seek other emergency help right away. A medical visit is only one step in your treatment. Even if you feel better, you still need to do what your doctor recommends, such as going to all suggested follow-up appointments and taking medicines exactly as directed. This will help you recover and help prevent future problems. How can you care for yourself at home? · Rest until you feel better. · Take your medicine exactly as prescribed. Call your doctor if you think you are having a problem with your medicine. · Do not drive after taking a prescription pain medicine. When should you call for help? Call 911 if:    · You passed out (lost consciousness).     · You have severe difficulty breathing.     · You have symptoms of a heart attack. These may include:  ? Chest pain or pressure, or a strange feeling in your chest.  ? Sweating. ? Shortness of breath. ? Nausea or vomiting. ? Pain, pressure, or a strange feeling in your back, neck, jaw, or upper belly or in one or both shoulders or arms. ? Lightheadedness or sudden weakness. ? A fast or irregular heartbeat. After you call 911, the  may tell you to chew 1 adult-strength or 2 to 4 low-dose aspirin. Wait for an ambulance.  Do not try to drive yourself.    Call your doctor today if:    · You have any trouble breathing.     · Your chest pain gets worse.     · You are dizzy or lightheaded, or you feel like you may faint.     · You are not getting better as expected.     · You are having new or different chest pain. Where can you learn more? Go to http://jesus-oj.info/. Enter A120 in the search box to learn more about \"Chest Pain: Care Instructions. \"  Current as of: September 23, 2018  Content Version: 11.9  © 7242-6565 [employer]. Care instructions adapted under license by [employer] (which disclaims liability or warranty for this information). If you have questions about a medical condition or this instruction, always ask your healthcare professional. [place] [address] any warranty or liability for your use of this information.

## 2019-03-06 NOTE — PROGRESS NOTES
Problem: Falls - Risk of  Goal: *Absence of Falls  Document Kwame Fall Risk and appropriate interventions in the flowsheet.   Outcome: Progressing Towards Goal  Fall Risk Interventions:            Medication Interventions: Patient to call before getting OOB

## 2019-03-06 NOTE — ED NOTES
Spoke with dr Brown leger advised thee was no telemetry bed, pt has 2 negative troponin , ok to go to remote telemetry bed

## 2019-03-06 NOTE — H&P
[facility] Cardiology History & Physical      Date of  Admission: 3/5/2019 11:26 PM     Primary Care Physician:  Dr Mandie Bailey  Primary Cardiologist:  Dr. Yuridia Pappas  Admitting Physician:  Dr Rick Rinne    CC:  Chest pain    HPI:  Jose Brown is a 52 y.o. male with a PMH of anxiety, HTN, and HLD, who presented to the ED at  with c/o chest pain. The chest pain is described as intermittent and in the center of his chest with radiation to right jaw. He sees Dr. Yuridia Pappas in the office and was seen last week for abnormal Ca score and ongoing chest pain. The chest pain has been on and off for over a year. The episodes are sporadic and he may go 2 months without having pain. Patient states when he has an episode of chest pain it is severe and can last for a few minutes up to 2 - 3 hours. He was scheduled for a nuclear stress test in the office for next week. In the ED his BP was 184/104. EKG showed SR with NSST changes. Labs were unremarkable with troponin level negative x 2. In the ED he received morphine,  mg, and SL nitro x 1 with improvement of symptoms. He was transferred to [facility] for further treatment. On arrival to [facility], his BP is still elevated at 164/94. On exam the patient is CP free but is very anxious. No history of CAD, CHF or arrhythmias. He does have a strong family history of CAD. Past Medical History:   Diagnosis Date    Chronic cough 10/2/2014    Family history of aortic aneurysm 10/2/2014    Glaucoma 2018    Hyperlipidemia     Paroxysmal tachycardia (HCC)     Post-nasal drip 10/2/2014    Varicose veins of other specified sites       Past Surgical History:   Procedure Laterality Date    HX TONSILLECTOMY         Allergies   Allergen Reactions    Gadobutrol Hives and Itching     Pt.  Broke out in hives,itching all over his body and throat feeling itchy and tight      Social History     Socioeconomic History    Marital status:      Spouse name: Not on file    Number of children: Not on file    Years of education: Not on file    Highest education level: Not on file   Social Needs    Financial resource strain: Not on file    Food insecurity - worry: Not on file    Food insecurity - inability: Not on file    Transportation needs - medical: Not on file   [employer] needs - non-medical: Not on file   Occupational History    Not on file   Tobacco Use    Smoking status: Never Smoker    Smokeless tobacco: Never Used   Substance and Sexual Activity    Alcohol use: Yes     Alcohol/week: 1.8 - 3.0 oz     Types: 3 - 5 Shots of liquor per week    Drug use: No    Sexual activity: Yes     Partners: Female   Other Topics Concern    Not on file   Social History Narrative    Not on file     Family History   Problem Relation Age of Onset    Bleeding Prob Father         Current Facility-Administered Medications   Medication Dose Route Frequency    aspirin tablet 325 mg  325 mg Oral ONCE    ALPRAZolam (XANAX) tablet 1 mg  1 mg Oral BID PRN    sodium chloride (NS) flush 5-40 mL  5-40 mL IntraVENous PRN    nitroglycerin (NITROBID) 2 % ointment 1 Inch  1 Inch Topical Q6H    nitroglycerin (NITROSTAT) tablet 0.4 mg  0.4 mg SubLINGual Q5MIN PRN    acetaminophen (TYLENOL) tablet 650 mg  650 mg Oral Q4H PRN    HYDROcodone-acetaminophen (NORCO) 5-325 mg per tablet 1 Tab  1 Tab Oral Q4H PRN    morphine injection 2 mg  2 mg IntraVENous Q4H PRN    metoprolol tartrate (LOPRESSOR) tablet 25 mg  25 mg Oral Q12H    0.9% sodium chloride infusion  75 mL/hr IntraVENous CONTINUOUS    ezetimibe/simvastatin (VYTORIN) 10/40 mg   Oral QPM       Review of Systems    Review of Systems   Constitution: Negative. HENT: Negative. Eyes: Negative. Cardiovascular: Positive for chest pain. Respiratory: Negative. Endocrine: Negative. Hematologic/Lymphatic: Negative. Skin: Negative. Musculoskeletal: Negative. Gastrointestinal: Positive for nausea. Genitourinary: Negative. Neurological: Negative. Psychiatric/Behavioral: The patient is nervous/anxious. Allergic/Immunologic: Negative. Subjective:     Visit Vitals  BP (!) 164/94 Comment: patient anxious, waiting for orders   Pulse 80   Temp 98.1 °F (36.7 °C)   Resp 18   SpO2 94%     Physical Exam   Constitutional: He is oriented to person, place, and time and well-developed, well-nourished, and in no distress. HENT:   Head: Normocephalic. Eyes: Pupils are equal, round, and reactive to light. Neck: Normal range of motion. Cardiovascular: Normal rate and regular rhythm. Pulmonary/Chest: Effort normal and breath sounds normal.   Abdominal: Soft. Bowel sounds are normal.   Musculoskeletal: Normal range of motion. Neurological: He is alert and oriented to person, place, and time. Skin: Skin is warm and dry. Psychiatric: Mood, memory, affect and judgment normal.       Cardiographics  Telemetry: normal sinus rhythm  ECG: normal sinus rhythm, nonspecific ST and T waves changes      Labs:   Recent Results (from the past 24 hour(s))   EKG, 12 LEAD, INITIAL    Collection Time: 03/05/19  6:52 PM   Result Value Ref Range    Ventricular Rate 62 BPM    Atrial Rate 62 BPM    P-R Interval 216 ms    QRS Duration 98 ms    Q-T Interval 404 ms    QTC Calculation (Bezet) 410 ms    Calculated P Axis 62 degrees    Calculated R Axis 5 degrees    Calculated T Axis 42 degrees    Diagnosis       !! AGE AND GENDER SPECIFIC ECG ANALYSIS !!   Sinus rhythm with 1st degree A-V block  Cannot rule out Anterior infarct , age undetermined  Abnormal ECG  No previous ECGs available     POC TROPONIN-I    Collection Time: 03/05/19  7:03 PM   Result Value Ref Range    Troponin-I (POC) 0 (L) 0.02 - 0.05 ng/ml   CBC WITH AUTOMATED DIFF    Collection Time: 03/05/19  7:04 PM   Result Value Ref Range    WBC 7.4 4.3 - 11.1 K/uL    RBC 6.16 (H) 4.23 - 5.6 M/uL    HGB 16.3 13.6 - 17.2 g/dL    HCT 50.9 (H) 41.1 - 50.3 %    MCV 82.6 79.6 - 97.8 FL    MCH 26.5 26.1 - 32.9 PG    MCHC 32.0 31.4 - 35.0 g/dL    RDW 14.7 (H) 11.9 - 14.6 %    PLATELET 598 938 - 545 K/uL    MPV 8.9 (L) 9.4 - 12.3 FL    ABSOLUTE NRBC 0.00 0.0 - 0.2 K/uL    DF AUTOMATED      NEUTROPHILS 70 43 - 78 %    LYMPHOCYTES 21 13 - 44 %    MONOCYTES 6 4.0 - 12.0 %    EOSINOPHILS 2 0.5 - 7.8 %    BASOPHILS 1 0.0 - 2.0 %    IMMATURE GRANULOCYTES 0 0.0 - 5.0 %    ABS. NEUTROPHILS 5.2 1.7 - 8.2 K/UL    ABS. LYMPHOCYTES 1.6 0.5 - 4.6 K/UL    ABS. MONOCYTES 0.5 0.1 - 1.3 K/UL    ABS. EOSINOPHILS 0.2 0.0 - 0.8 K/UL    ABS. BASOPHILS 0.0 0.0 - 0.2 K/UL    ABS. IMM. GRANS. 0.0 0.0 - 0.5 K/UL   METABOLIC PANEL, COMPREHENSIVE    Collection Time: 03/05/19  7:04 PM   Result Value Ref Range    Sodium 136 136 - 145 mmol/L    Potassium 3.9 3.5 - 5.1 mmol/L    Chloride 100 98 - 107 mmol/L    CO2 28 21 - 32 mmol/L    Anion gap 8 mmol/L    Glucose 100 65 - 100 mg/dL    BUN 13 6 - 23 MG/DL    Creatinine 1.06 0.8 - 1.5 MG/DL    GFR est AA >60 >60 ml/min/1.73m2    GFR est non-AA >60 ml/min/1.73m2    Calcium 9.8 8.3 - 10.4 MG/DL    Bilirubin, total 0.5 0.2 - 1.1 MG/DL    ALT (SGPT) 51 12 - 65 U/L    AST (SGOT) 45 (H) 15 - 37 U/L    Alk. phosphatase 79 50 - 136 U/L    Protein, total 8.1 g/dL    Albumin 4.1 3.5 - 5.0 g/dL    Globulin 4.0 (H) 2.3 - 3.5 g/dL    A-G Ratio 1.0     TROPONIN I    Collection Time: 03/05/19  7:04 PM   Result Value Ref Range    Troponin-I, Qt. <0.02 (L) 0.02 - 0.05 NG/ML   LIPASE    Collection Time: 03/05/19  7:04 PM   Result Value Ref Range    Lipase 167 73 - 393 U/L   D DIMER    Collection Time: 03/05/19  7:04 PM   Result Value Ref Range    D DIMER 0.30 <0.56 ug/ml(FEU)   POC TROPONIN-I    Collection Time: 03/05/19 10:04 PM   Result Value Ref Range    Troponin-I (POC) 0 (L) 0.02 - 0.05 ng/ml       Patient has been seen and examined by Dr. Vanna Snell and he agrees with the following assessment and plan:     Assessment/Plan:       Principal Problem:    Chest pain-- trend troponin levels.   Plan on [facility] in AM.  NS @ 75 cc/hr. Continue ASA and statin. Add BB. Repeat labs in AM.      Active Problems:    Hyperlipidemia -- continue statin      Essential hypertension with goal blood pressure less than 130/85 -- metoprolol 25 mg Q12h added. Anxiety -- home xanax ordered.      Khadijah Lilly NP  3/6/2019 3:12 AM

## 2019-03-06 NOTE — PROGRESS NOTES
TRANSFER - IN REPORT:    Verbal report received from Shanta Hatfield RN(name) on Leana Kc  being received from [place] ED(unit) for routine progression of care      Report consisted of patients Situation, Background, Assessment and   Recommendations(SBAR). Information from the following report(s) SBAR, Kardex, ED Summary, [facility] and Recent Results was reviewed with the receiving nurse. Opportunity for questions and clarification was provided. Assessment completed upon patients arrival to unit and care assumed.

## 2019-03-06 NOTE — ED NOTES
TRANSFER - OUT REPORT:    Verbal report given to edilson LEOS(name) on Eulalio Ibrahim  being transferred to [facility](unit) for routine progression of care       Report consisted of patients Situation, Background, Assessment and   Recommendations(SBAR). Information from the following report(s) ED Summary, Procedure Summary, Intake/Output and Recent Results was reviewed with the receiving nurse. Lines:   Peripheral IV 03/05/19 Right Antecubital (Active)   Site Assessment Clean, dry, & intact 3/5/2019  7:01 PM   Phlebitis Assessment 0 3/5/2019  7:01 PM   Infiltration Assessment 0 3/5/2019  7:01 PM   Dressing Status Clean, dry, & intact 3/5/2019  7:01 PM   Dressing Type Tape;Transparent 3/5/2019  7:01 PM   Hub Color/Line Status Green;Flushed;Patent 3/5/2019  7:01 PM   Action Taken Blood drawn 3/5/2019  7:01 PM        Opportunity for questions and clarification was provided.       Patient transported with:   Lj Hare

## 2019-04-04 ENCOUNTER — HOSPITAL ENCOUNTER (OUTPATIENT)
Dept: CARDIAC CATH/INVASIVE PROCEDURES | Age: 54
Discharge: HOME | End: 2019-04-04
Attending: INTERNAL MEDICINE | Admitting: INTERNAL MEDICINE
Payer: COMMERCIAL

## 2019-04-04 VITALS
TEMPERATURE: 98.1 F | WEIGHT: 225 LBS | RESPIRATION RATE: 18 BRPM | BODY MASS INDEX: 30.48 KG/M2 | HEART RATE: 88 BPM | OXYGEN SATURATION: 95 % | DIASTOLIC BLOOD PRESSURE: 65 MMHG | HEIGHT: 72 IN | SYSTOLIC BLOOD PRESSURE: 130 MMHG

## 2019-04-04 LAB
ANION GAP SERPL CALC-SCNC: 6 MMOL/L (ref 7–16)
ATRIAL RATE: 72 BPM
BUN SERPL-MCNC: 13 MG/DL (ref 6–23)
CALCIUM SERPL-MCNC: 9.6 MG/DL (ref 8.3–10.4)
CALCULATED P AXIS, ECG09: 61 DEGREES
CALCULATED R AXIS, ECG10: 20 DEGREES
CALCULATED T AXIS, ECG11: 48 DEGREES
CHLORIDE SERPL-SCNC: 105 MMOL/L (ref 98–107)
CO2 SERPL-SCNC: 28 MMOL/L (ref 21–32)
CREAT SERPL-MCNC: 0.99 MG/DL (ref 0.8–1.5)
DIAGNOSIS, 93000: NORMAL
ERYTHROCYTE [DISTWIDTH] IN BLOOD BY AUTOMATED COUNT: 14.7 % (ref 11.9–14.6)
GLUCOSE SERPL-MCNC: 91 MG/DL (ref 65–100)
HCT VFR BLD AUTO: 49.1 % (ref 41.1–50.3)
HGB BLD-MCNC: 16 G/DL (ref 13.6–17.2)
INR PPP: 1
MCH RBC QN AUTO: 27 PG (ref 26.1–32.9)
MCHC RBC AUTO-ENTMCNC: 32.6 G/DL (ref 31.4–35)
MCV RBC AUTO: 82.8 FL (ref 79.6–97.8)
NRBC # BLD: 0 K/UL (ref 0–0.2)
P-R INTERVAL, ECG05: 216 MS
PLATELET # BLD AUTO: 250 K/UL (ref 150–450)
PMV BLD AUTO: 9.1 FL (ref 9.4–12.3)
POTASSIUM SERPL-SCNC: 4.2 MMOL/L (ref 3.5–5.1)
PROTHROMBIN TIME: 13.3 SEC (ref 11.7–14.5)
Q-T INTERVAL, ECG07: 390 MS
QRS DURATION, ECG06: 100 MS
QTC CALCULATION (BEZET), ECG08: 427 MS
RBC # BLD AUTO: 5.93 M/UL (ref 4.23–5.6)
SODIUM SERPL-SCNC: 139 MMOL/L (ref 136–145)
VENTRICULAR RATE, ECG03: 72 BPM
WBC # BLD AUTO: 5.4 K/UL (ref 4.3–11.1)

## 2019-04-04 PROCEDURE — C1769 GUIDE WIRE: HCPCS

## 2019-04-04 PROCEDURE — 74011000250 HC RX REV CODE- 250: Performed by: INTERNAL MEDICINE

## 2019-04-04 PROCEDURE — C1894 INTRO/SHEATH, NON-LASER: HCPCS

## 2019-04-04 PROCEDURE — 99152 MOD SED SAME PHYS/QHP 5/>YRS: CPT

## 2019-04-04 PROCEDURE — 74011250636 HC RX REV CODE- 250/636

## 2019-04-04 PROCEDURE — 85610 PROTHROMBIN TIME: CPT

## 2019-04-04 PROCEDURE — 93458 L HRT ARTERY/VENTRICLE ANGIO: CPT

## 2019-04-04 PROCEDURE — 74011250636 HC RX REV CODE- 250/636: Performed by: INTERNAL MEDICINE

## 2019-04-04 PROCEDURE — 80048 BASIC METABOLIC PNL TOTAL CA: CPT

## 2019-04-04 PROCEDURE — 74011250637 HC RX REV CODE- 250/637: Performed by: INTERNAL MEDICINE

## 2019-04-04 PROCEDURE — 77030004534 HC CATH ANGI DX INFN CARD -A

## 2019-04-04 PROCEDURE — 93005 ELECTROCARDIOGRAM TRACING: CPT | Performed by: INTERNAL MEDICINE

## 2019-04-04 PROCEDURE — 74011636320 HC RX REV CODE- 636/320: Performed by: INTERNAL MEDICINE

## 2019-04-04 PROCEDURE — 85027 COMPLETE CBC AUTOMATED: CPT

## 2019-04-04 PROCEDURE — 77030019569 HC BND COMPR RAD TERU -B

## 2019-04-04 PROCEDURE — 77030015766

## 2019-04-04 RX ORDER — DIPHENHYDRAMINE HCL 25 MG
25 CAPSULE ORAL AS NEEDED
Status: DISCONTINUED | OUTPATIENT
Start: 2019-04-04 | End: 2019-04-04

## 2019-04-04 RX ORDER — HYDROCORTISONE SODIUM SUCCINATE 100 MG/2ML
100 INJECTION, POWDER, FOR SOLUTION INTRAMUSCULAR; INTRAVENOUS ONCE
Status: COMPLETED | OUTPATIENT
Start: 2019-04-04 | End: 2019-04-04

## 2019-04-04 RX ORDER — HEPARIN SODIUM 200 [USP'U]/100ML
3 INJECTION, SOLUTION INTRAVENOUS CONTINUOUS
Status: DISCONTINUED | OUTPATIENT
Start: 2019-04-04 | End: 2019-04-04 | Stop reason: HOSPADM

## 2019-04-04 RX ORDER — SODIUM CHLORIDE 9 MG/ML
75 INJECTION, SOLUTION INTRAVENOUS CONTINUOUS
Status: DISCONTINUED | OUTPATIENT
Start: 2019-04-04 | End: 2019-04-04 | Stop reason: HOSPADM

## 2019-04-04 RX ORDER — LIDOCAINE HYDROCHLORIDE 10 MG/ML
10-30 INJECTION INFILTRATION; PERINEURAL ONCE
Status: COMPLETED | OUTPATIENT
Start: 2019-04-04 | End: 2019-04-04

## 2019-04-04 RX ORDER — FENTANYL CITRATE 50 UG/ML
25-75 INJECTION, SOLUTION INTRAMUSCULAR; INTRAVENOUS
Status: DISCONTINUED | OUTPATIENT
Start: 2019-04-04 | End: 2019-04-04 | Stop reason: HOSPADM

## 2019-04-04 RX ORDER — FAMOTIDINE 10 MG/ML
20 INJECTION INTRAVENOUS AS NEEDED
Status: DISCONTINUED | OUTPATIENT
Start: 2019-04-04 | End: 2019-04-04 | Stop reason: HOSPADM

## 2019-04-04 RX ORDER — DIPHENHYDRAMINE HCL 25 MG
50 CAPSULE ORAL AS NEEDED
Status: DISCONTINUED | OUTPATIENT
Start: 2019-04-04 | End: 2019-04-04 | Stop reason: HOSPADM

## 2019-04-04 RX ORDER — SODIUM CHLORIDE 0.9 % (FLUSH) 0.9 %
5-40 SYRINGE (ML) INJECTION AS NEEDED
Status: DISCONTINUED | OUTPATIENT
Start: 2019-04-04 | End: 2019-04-04 | Stop reason: HOSPADM

## 2019-04-04 RX ORDER — DIAZEPAM 5 MG/1
5 TABLET ORAL ONCE
Status: COMPLETED | OUTPATIENT
Start: 2019-04-04 | End: 2019-04-04

## 2019-04-04 RX ORDER — GUAIFENESIN 100 MG/5ML
81-324 LIQUID (ML) ORAL ONCE
Status: DISCONTINUED | OUTPATIENT
Start: 2019-04-04 | End: 2019-04-04 | Stop reason: HOSPADM

## 2019-04-04 RX ORDER — SODIUM CHLORIDE 0.9 % (FLUSH) 0.9 %
5-40 SYRINGE (ML) INJECTION EVERY 8 HOURS
Status: DISCONTINUED | OUTPATIENT
Start: 2019-04-04 | End: 2019-04-04 | Stop reason: HOSPADM

## 2019-04-04 RX ORDER — MIDAZOLAM HYDROCHLORIDE 1 MG/ML
.5-2 INJECTION, SOLUTION INTRAMUSCULAR; INTRAVENOUS
Status: DISCONTINUED | OUTPATIENT
Start: 2019-04-04 | End: 2019-04-04 | Stop reason: HOSPADM

## 2019-04-04 RX ADMIN — MIDAZOLAM HYDROCHLORIDE 1 MG: 1 INJECTION, SOLUTION INTRAMUSCULAR; INTRAVENOUS at 13:02

## 2019-04-04 RX ADMIN — HEPARIN SODIUM 2 ML: 10000 INJECTION INTRAVENOUS; SUBCUTANEOUS at 12:58

## 2019-04-04 RX ADMIN — SODIUM CHLORIDE 75 ML/HR: 900 INJECTION, SOLUTION INTRAVENOUS at 11:05

## 2019-04-04 RX ADMIN — LIDOCAINE HYDROCHLORIDE 5 ML: 10 INJECTION, SOLUTION INFILTRATION; PERINEURAL at 12:57

## 2019-04-04 RX ADMIN — FENTANYL CITRATE 25 MCG: 50 INJECTION, SOLUTION INTRAMUSCULAR; INTRAVENOUS at 12:56

## 2019-04-04 RX ADMIN — HYDROCORTISONE SODIUM SUCCINATE 100 MG: 100 INJECTION, POWDER, FOR SOLUTION INTRAMUSCULAR; INTRAVENOUS at 11:18

## 2019-04-04 RX ADMIN — IOPAMIDOL 100 ML: 755 INJECTION, SOLUTION INTRAVENOUS at 13:07

## 2019-04-04 RX ADMIN — FENTANYL CITRATE 25 MCG: 50 INJECTION, SOLUTION INTRAMUSCULAR; INTRAVENOUS at 13:02

## 2019-04-04 RX ADMIN — HEPARIN SODIUM 3 UNITS/HR: 5000 INJECTION, SOLUTION INTRAVENOUS; SUBCUTANEOUS at 12:36

## 2019-04-04 RX ADMIN — DIPHENHYDRAMINE HYDROCHLORIDE 50 MG: 25 CAPSULE ORAL at 11:17

## 2019-04-04 RX ADMIN — MIDAZOLAM HYDROCHLORIDE 2 MG: 1 INJECTION, SOLUTION INTRAMUSCULAR; INTRAVENOUS at 12:54

## 2019-04-04 RX ADMIN — FENTANYL CITRATE 50 MCG: 50 INJECTION, SOLUTION INTRAMUSCULAR; INTRAVENOUS at 12:54

## 2019-04-04 RX ADMIN — DIAZEPAM 5 MG: 5 TABLET ORAL at 10:54

## 2019-04-04 RX ADMIN — FAMOTIDINE 20 MG: 10 INJECTION, SOLUTION INTRAVENOUS at 11:18

## 2019-04-04 RX ADMIN — MIDAZOLAM HYDROCHLORIDE 1 MG: 1 INJECTION, SOLUTION INTRAMUSCULAR; INTRAVENOUS at 12:56

## 2019-04-04 NOTE — PROCEDURES
[facility]  CARDIAC CATH    Name:  Constantino Amaro  MR#:  [id number]  :  1965  ACCOUNT #:  [de-identified]  DATE OF SERVICE:  2019    CLINICAL INFORMATION:  Patient with chest pain concerning for Sammarinese class III angina, referred for catheterization. PROCEDURE IN DETAIL:  After informed consent was obtained, a 6-Kinyarwanda sheath was placed in the right radial artery. Radial cocktail was given by protocol. A 5-Kinyarwanda Tiger catheter and angled pigtail were used for diagnostic angiography. TR band was placed at the end of the procedure with good hemostasis. Conscious sedation was given by Canelo Edouard RN beginning at 12:55, concluding at 01:15 with a total of 4 mg of Versed, 100 mcg of fentanyl. Vital signs and saturation were stable throughout. FINDINGS:  The left ventricle is normal size, normal systolic function, ejection fraction is 60%. Left ventricular end-diastolic pressure is 19 mmHg with an opening aortic pressure of 127/88. No gradient across the aortic valve. Left main coronary artery is in a normal anatomic position, free of significant disease, bifurcates into LAD and circumflex system. There is no disease in the left main. The LAD has intramyocardial bridge in the midportion with systolic compression of 01%. The ramus intermedius or high obtuse marginal branch bifurcates distally with 20% ostial narrowing. The AV groove circumflex is small and has 30% narrowing in the tortuous bend of the vessel. The right coronary artery is a large dominant vessel, 20% distal narrowing, some mild 10% luminal irregularities before the takeoff of the PDA. The PDA and posterolateral obtuse marginal branches are free of any significant atherosclerotic narrowing. CONCLUSION:  1. Mild coronary atherosclerotic heart disease with no high-grade narrowing seen. 2.  Preserved LV systolic function.       MD CHRIS [id number]  D: 04/04/2019 13:16  T:  04/04/2019 13:17  JOB #:  [id number]

## 2019-04-04 NOTE — PROGRESS NOTES
TRANSFER - IN REPORT: 
 
Verbal report received from [place]) on Gearldine Pleasure  being received from cath lab(unit) for routine progression of care Report consisted of patients Situation, Background, Assessment and  
Recommendations(SBAR). Information from the following report(s) Procedure Summary was reviewed with the receiving nurse. Opportunity for questions and clarification was provided. Assessment completed upon patients arrival to unit and care assumed.

## 2019-04-04 NOTE — PROGRESS NOTES
Patient received to [facility] room # 11  Ambulatory from [facility]. Patient scheduled for [facility] today with Dr Eleni Pacheco. Procedure reviewed & questions answered, voiced good understanding consent obtained & placed on chart. All medications and medical history reviewed. Will prep patient per orders. Patient & family updated on plan of care. The patient has a fraility score of 2-WELL, based on reports random chest pain

## 2019-04-04 NOTE — PROCEDURES
Brief Cardiac Procedure Note    Patient: Gina Clinton MRN: [id number]  SSN: [id number]    YOB: 1965  Age: 52 y.o. Sex: male      Date of Procedure: 4/4/2019     Pre-procedure Diagnosis: Atypical Angina    Post-procedure Diagnosis: Non-cardiac Chest Pain    Reason for Procedure: New Onset Angina < or = 2 Months    Procedure: Left Heart Catheterization    Brief Description of Procedure: lhc via right radial, tr    Performed By: Josefa Glass MD     Assistants: none    Anesthesia: Moderate Sedation    Estimated Blood Loss: Less than 10 mL      Specimens: None    Implants: None    Findings: mild cad, nl lvef    Complications: None    Recommendations: Continue medical therapy.     Signed By: Josefa Glass MD     April 4, 2019

## 2019-04-04 NOTE — ROUTINE PROCESS
TRANSFER - OUT REPORT: 
 
[facility] Dr. Alonso Rodriguez 
RRA access Versed 4mg, fentanyl 100mcg Diagnostic cath, medical management TR band placed @ 1310 with 14ml air Verbal report given to Cristian Waite RN(name) on Sowmya Augustin  being transferred to cpru(unit) for routine progression of care Report consisted of patients Situation, Background, Assessment and  
Recommendations(SBAR). Information from the following report(s) Procedure Summary was reviewed with the receiving nurse. Lines:  
Peripheral IV 04/04/19 Right Antecubital (Active) Peripheral IV 04/04/19 Distal;Right (Active) Peripheral IV 04/04/19 Left Antecubital (Active) Opportunity for questions and clarification was provided. Patient transported with: 
 Dotted Block

## 2019-04-04 NOTE — DISCHARGE INSTRUCTIONS
HEART CATHETERIZATION/ANGIOGRAPHY DISCHARGE INSTRUCTIONS    1. Check puncture site frequently for swelling or bleeding. If there is any bleeding, lie down and apply pressure over the area with a clean towel or washcloth. Call 911. Notify your doctor for any redness, swelling, drainage, or oozing from the puncture site. Notify your doctor for any fever or chills. 2. If the extremity becomes cold, numb, or painful call [facility] Cardiology at [phone].  3. Activity should be limited for the next 48 hours. Climb stairs as little as possible and avoid any stooping, bending, or strenuous activity for 48 hours. No heavy lifting (anything over 10 pounds) for 3 days. 4. You may resume your usual diet. Drink more fluids than usual.  5. Have a responsible person drive you home and stay with you for at least 24 hours after your heart catheterization/angiography. Do not drive for the next 24 hours. 6. You may remove bandage from your Right wrist in 24 hours. You may shower in 24 hours. No tub baths, hot tubs, or swimming for 1 week. Do not place any lotions, creams, powders, or ointments over puncture site for 1 week. 7. Please continue your medications as prescribed by your physician. I have read the above instructions and have had the opportunity to ask questions.       Patient: ________________________   Date: 4/4/2019    Witness: _______________________   Date: 4/4/2019

## 2019-06-11 PROBLEM — N30.01 ACUTE CYSTITIS WITH HEMATURIA: Status: ACTIVE | Noted: 2019-06-11

## 2019-06-11 PROBLEM — Z20.2 POSSIBLE EXPOSURE TO STD: Status: ACTIVE | Noted: 2019-06-11

## 2019-06-12 ENCOUNTER — HOSPITAL ENCOUNTER (INPATIENT)
Age: 54
LOS: 3 days | Discharge: HOME | DRG: 872 | End: 2019-06-15
Attending: EMERGENCY MEDICINE | Admitting: HOSPITALIST
Payer: COMMERCIAL

## 2019-06-12 ENCOUNTER — APPOINTMENT (OUTPATIENT)
Dept: GENERAL RADIOLOGY | Age: 54
DRG: 872 | End: 2019-06-12
Attending: EMERGENCY MEDICINE
Payer: COMMERCIAL

## 2019-06-12 DIAGNOSIS — N17.9 ACUTE RENAL FAILURE, UNSPECIFIED ACUTE RENAL FAILURE TYPE (HCC): ICD-10-CM

## 2019-06-12 DIAGNOSIS — N39.0 ACUTE UTI: Primary | ICD-10-CM

## 2019-06-12 DIAGNOSIS — R65.10 SIRS (SYSTEMIC INFLAMMATORY RESPONSE SYNDROME) (HCC): ICD-10-CM

## 2019-06-12 PROBLEM — A41.9 SEPSIS (HCC): Status: ACTIVE | Noted: 2019-06-12

## 2019-06-12 LAB
ANION GAP SERPL CALC-SCNC: 10 MMOL/L (ref 7–16)
BASOPHILS # BLD: 0 K/UL (ref 0–0.2)
BASOPHILS NFR BLD: 0 % (ref 0–2)
BUN SERPL-MCNC: 13 MG/DL (ref 6–23)
CALCIUM SERPL-MCNC: 9.5 MG/DL (ref 8.3–10.4)
CHLORIDE SERPL-SCNC: 98 MMOL/L (ref 98–107)
CO2 SERPL-SCNC: 25 MMOL/L (ref 21–32)
CREAT SERPL-MCNC: 1.61 MG/DL (ref 0.8–1.5)
DIFFERENTIAL METHOD BLD: ABNORMAL
EOSINOPHIL # BLD: 0 K/UL (ref 0–0.8)
EOSINOPHIL NFR BLD: 0 % (ref 0.5–7.8)
ERYTHROCYTE [DISTWIDTH] IN BLOOD BY AUTOMATED COUNT: 14.6 % (ref 11.9–14.6)
GLUCOSE SERPL-MCNC: 133 MG/DL (ref 65–100)
HCT VFR BLD AUTO: 45.4 % (ref 41.1–50.3)
HGB BLD-MCNC: 14.9 G/DL (ref 13.6–17.2)
IMM GRANULOCYTES # BLD AUTO: 0.1 K/UL (ref 0–0.5)
IMM GRANULOCYTES NFR BLD AUTO: 1 % (ref 0–5)
LACTATE BLD-SCNC: 1.07 MMOL/L (ref 0.5–1.9)
LYMPHOCYTES # BLD: 0.3 K/UL (ref 0.5–4.6)
LYMPHOCYTES NFR BLD: 5 % (ref 13–44)
MCH RBC QN AUTO: 26.8 PG (ref 26.1–32.9)
MCHC RBC AUTO-ENTMCNC: 32.8 G/DL (ref 31.4–35)
MCV RBC AUTO: 81.8 FL (ref 79.6–97.8)
MONOCYTES # BLD: 0.4 K/UL (ref 0.1–1.3)
MONOCYTES NFR BLD: 6 % (ref 4–12)
NEUTS SEG # BLD: 6.5 K/UL (ref 1.7–8.2)
NEUTS SEG NFR BLD: 89 % (ref 43–78)
NRBC # BLD: 0 K/UL (ref 0–0.2)
PLATELET # BLD AUTO: 122 K/UL (ref 150–450)
PMV BLD AUTO: 9 FL (ref 9.4–12.3)
POTASSIUM SERPL-SCNC: 4.2 MMOL/L (ref 3.5–5.1)
PROCALCITONIN SERPL-MCNC: 15.9 NG/ML
RBC # BLD AUTO: 5.55 M/UL (ref 4.23–5.6)
SODIUM SERPL-SCNC: 133 MMOL/L (ref 136–145)
WBC # BLD AUTO: 7.3 K/UL (ref 4.3–11.1)

## 2019-06-12 PROCEDURE — 96365 THER/PROPH/DIAG IV INF INIT: CPT | Performed by: EMERGENCY MEDICINE

## 2019-06-12 PROCEDURE — 85025 COMPLETE CBC W/AUTO DIFF WBC: CPT

## 2019-06-12 PROCEDURE — 65270000029 HC RM PRIVATE

## 2019-06-12 PROCEDURE — 74011250637 HC RX REV CODE- 250/637: Performed by: EMERGENCY MEDICINE

## 2019-06-12 PROCEDURE — 87040 BLOOD CULTURE FOR BACTERIA: CPT

## 2019-06-12 PROCEDURE — 83605 ASSAY OF LACTIC ACID: CPT

## 2019-06-12 PROCEDURE — 74011250636 HC RX REV CODE- 250/636: Performed by: HOSPITALIST

## 2019-06-12 PROCEDURE — 99285 EMERGENCY DEPT VISIT HI MDM: CPT | Performed by: EMERGENCY MEDICINE

## 2019-06-12 PROCEDURE — 80048 BASIC METABOLIC PNL TOTAL CA: CPT

## 2019-06-12 PROCEDURE — 71046 X-RAY EXAM CHEST 2 VIEWS: CPT

## 2019-06-12 PROCEDURE — 74011250637 HC RX REV CODE- 250/637: Performed by: HOSPITALIST

## 2019-06-12 PROCEDURE — 74011000258 HC RX REV CODE- 258: Performed by: EMERGENCY MEDICINE

## 2019-06-12 PROCEDURE — 84145 PROCALCITONIN (PCT): CPT

## 2019-06-12 PROCEDURE — 74011250636 HC RX REV CODE- 250/636: Performed by: EMERGENCY MEDICINE

## 2019-06-12 RX ORDER — ACETAMINOPHEN 500 MG
1000 TABLET ORAL
Status: COMPLETED | OUTPATIENT
Start: 2019-06-12 | End: 2019-06-12

## 2019-06-12 RX ORDER — ALPRAZOLAM 0.5 MG/1
1 TABLET ORAL
Status: DISCONTINUED | OUTPATIENT
Start: 2019-06-12 | End: 2019-06-15 | Stop reason: HOSPADM

## 2019-06-12 RX ORDER — IBUPROFEN 600 MG/1
600 TABLET ORAL
Status: DISCONTINUED | OUTPATIENT
Start: 2019-06-12 | End: 2019-06-15 | Stop reason: HOSPADM

## 2019-06-12 RX ORDER — MORPHINE SULFATE 2 MG/ML
2 INJECTION, SOLUTION INTRAMUSCULAR; INTRAVENOUS
Status: DISCONTINUED | OUTPATIENT
Start: 2019-06-12 | End: 2019-06-15 | Stop reason: HOSPADM

## 2019-06-12 RX ORDER — TEMAZEPAM 15 MG/1
15 CAPSULE ORAL
Status: DISPENSED | OUTPATIENT
Start: 2019-06-12 | End: 2019-06-14

## 2019-06-12 RX ORDER — ACETAMINOPHEN 650 MG/1
650 SUPPOSITORY RECTAL
Status: DISCONTINUED | OUTPATIENT
Start: 2019-06-12 | End: 2019-06-13

## 2019-06-12 RX ORDER — ADHESIVE BANDAGE
30 BANDAGE TOPICAL DAILY PRN
Status: DISCONTINUED | OUTPATIENT
Start: 2019-06-12 | End: 2019-06-15 | Stop reason: HOSPADM

## 2019-06-12 RX ORDER — SODIUM CHLORIDE 9 MG/ML
150 INJECTION, SOLUTION INTRAVENOUS CONTINUOUS
Status: DISCONTINUED | OUTPATIENT
Start: 2019-06-12 | End: 2019-06-14

## 2019-06-12 RX ORDER — HYDROCODONE BITARTRATE AND ACETAMINOPHEN 7.5; 325 MG/1; MG/1
1 TABLET ORAL
Status: DISCONTINUED | OUTPATIENT
Start: 2019-06-12 | End: 2019-06-15 | Stop reason: HOSPADM

## 2019-06-12 RX ORDER — ANASTROZOLE 1 MG/1
1 TABLET ORAL DAILY
Status: DISCONTINUED | OUTPATIENT
Start: 2019-06-13 | End: 2019-06-12

## 2019-06-12 RX ORDER — SODIUM CHLORIDE 0.9 % (FLUSH) 0.9 %
5-40 SYRINGE (ML) INJECTION EVERY 8 HOURS
Status: DISCONTINUED | OUTPATIENT
Start: 2019-06-12 | End: 2019-06-15 | Stop reason: HOSPADM

## 2019-06-12 RX ORDER — NALOXONE HYDROCHLORIDE 0.4 MG/ML
0.4 INJECTION, SOLUTION INTRAMUSCULAR; INTRAVENOUS; SUBCUTANEOUS AS NEEDED
Status: DISCONTINUED | OUTPATIENT
Start: 2019-06-12 | End: 2019-06-15 | Stop reason: HOSPADM

## 2019-06-12 RX ORDER — SODIUM CHLORIDE 0.9 % (FLUSH) 0.9 %
5-40 SYRINGE (ML) INJECTION AS NEEDED
Status: DISCONTINUED | OUTPATIENT
Start: 2019-06-12 | End: 2019-06-15 | Stop reason: HOSPADM

## 2019-06-12 RX ORDER — IBUPROFEN 200 MG
1 TABLET ORAL
Status: DISCONTINUED | OUTPATIENT
Start: 2019-06-12 | End: 2019-06-15 | Stop reason: HOSPADM

## 2019-06-12 RX ORDER — DIPHENHYDRAMINE HCL 25 MG
25 CAPSULE ORAL
Status: DISCONTINUED | OUTPATIENT
Start: 2019-06-12 | End: 2019-06-15 | Stop reason: HOSPADM

## 2019-06-12 RX ORDER — PROMETHAZINE HYDROCHLORIDE 25 MG/1
25 TABLET ORAL
Status: DISCONTINUED | OUTPATIENT
Start: 2019-06-12 | End: 2019-06-15 | Stop reason: HOSPADM

## 2019-06-12 RX ORDER — ENOXAPARIN SODIUM 100 MG/ML
40 INJECTION SUBCUTANEOUS EVERY 24 HOURS
Status: DISCONTINUED | OUTPATIENT
Start: 2019-06-12 | End: 2019-06-15 | Stop reason: HOSPADM

## 2019-06-12 RX ORDER — ASPIRIN 325 MG
325 TABLET ORAL DAILY
Status: DISCONTINUED | OUTPATIENT
Start: 2019-06-13 | End: 2019-06-15 | Stop reason: HOSPADM

## 2019-06-12 RX ADMIN — TEMAZEPAM 15 MG: 15 CAPSULE ORAL at 23:45

## 2019-06-12 RX ADMIN — MORPHINE SULFATE 2 MG: 2 INJECTION, SOLUTION INTRAMUSCULAR; INTRAVENOUS at 22:08

## 2019-06-12 RX ADMIN — IBUPROFEN 600 MG: 600 TABLET, FILM COATED ORAL at 17:20

## 2019-06-12 RX ADMIN — CEFTRIAXONE 1 G: 1 INJECTION, POWDER, FOR SOLUTION INTRAMUSCULAR; INTRAVENOUS at 14:35

## 2019-06-12 RX ADMIN — Medication 10 ML: at 22:09

## 2019-06-12 RX ADMIN — ENOXAPARIN SODIUM 40 MG: 40 INJECTION SUBCUTANEOUS at 20:21

## 2019-06-12 RX ADMIN — SODIUM CHLORIDE 150 ML/HR: 900 INJECTION, SOLUTION INTRAVENOUS at 18:06

## 2019-06-12 RX ADMIN — SODIUM CHLORIDE 1000 ML: 900 INJECTION, SOLUTION INTRAVENOUS at 14:35

## 2019-06-12 RX ADMIN — ACETAMINOPHEN 1000 MG: 500 TABLET, FILM COATED ORAL at 16:33

## 2019-06-12 NOTE — ED TRIAGE NOTES
Patient arrives complaining of global body aches and pain. Patient was seen by nurse practitioner and was told he had urinary tract infection, and possible prostatitis. He was prescribed Sulfamethoxazole-tmp, which he has started taking. Patient also reports cough for the past several years making it difficult to talk in sentences.

## 2019-06-12 NOTE — H&P
INTERNAL MEDICINE H&P/CONSULT    Subjective:     Patient is a 52years old male with history of HTN and kidney stone once, presents with general fatigue up to even able to make full sentences, fever and chills for last few days. He was seen by his PCP this week and rectal exam showed non tender prostate but treated as UTI in male. The pt was severely tachycardic on arrival. He appears ill. No hx of frequent UTI, BPH or urinary incontinence. No blood in urine. Past Medical History:   Diagnosis Date    Arthritis     Chronic cough 10/2/2014    Family history of aortic aneurysm 10/2/2014    Glaucoma 2018    Hyperlipidemia     Paroxysmal tachycardia (HCC)     Post-nasal drip 10/2/2014    Psychiatric disorder     PTSD    Varicose veins of other specified sites       Past Surgical History:   Procedure Laterality Date    HX TONSILLECTOMY        Prior to Admission medications    Medication Sig Start Date End Date Taking? Authorizing Provider   trimethoprim-sulfamethoxazole (BACTRIM DS, SEPTRA DS) 160-800 mg per tablet Take 1 Tab by mouth two (2) times a day for 5 days. 6/11/19 6/16/19  NIK Monet   ALPRAZolam Marlyn Babinski) 1 mg tablet Take 1 Tab by mouth daily as needed for Anxiety. 6/4/19   Joao Gonzales MD   celecoxib (CELEBREX) 100 mg capsule Take 1 Cap by mouth two (2) times a day for 90 days. 6/4/19 9/2/19  Joao Gonzales MD   aspirin (ASPIRIN) 325 mg tablet Take 325 mg by mouth daily. Other, MD [place]   vitamin k 100 mcg tablet Take 200 mcg by mouth daily. Provider, Historical   testosterone (ANDROGEL) 20.25 mg/1.25 gram (1.62 %) gel Apply 81 mg to affected area daily. Max Daily Amount: 81 mg. 2/20/19   Joao Gonzales MD   ezetimibe-simvastatin (VYTORIN 10/40) 10-40 mg per tablet Take 1 Tab by mouth daily. 2/4/19   Joao Gonzales MD   tadalafil (CIALIS) 20 mg tablet Take 1 Tab by mouth as needed.  2/4/19   Joao Gonzales MD   omega-3 acid ethyl esters (LOVAZA) 1 gram capsule Take 2 Caps by mouth two (2) times daily (with meals). 2/4/19   Denisa Marcus MD   mometasone (NASONEX) 50 mcg/actuation nasal spray 2 Sprays by Both Nostrils route daily. 9/25/18   Denisa Marcus MD   anastrozole (ARIMIDEX) 1 mg tablet Take 1 mg by mouth as needed. Sunday and Thursday     Provider, Historical   cholecalciferol, vitamin d3, (VITAMIN D3) 400 unit cap Take  by mouth. Provider, Historical     Allergies   Allergen Reactions    Gadobutrol Hives and Itching     Pt. Broke out in hives,itching all over his body and throat feeling itchy and tight    Contrast Agent [Iodine] Rash      Social History     Tobacco Use    Smoking status: Never Smoker    Smokeless tobacco: Never Used   Substance Use Topics    Alcohol use: Yes     Alcohol/week: 1.8 - 3.0 oz     Types: 3 - 5 Shots of liquor per week        Family History:  HTN    Review of Systems   A comprehensive review of systems was negative except for that written in the HPI. Objective: Intake / Output:  No intake/output data recorded. No intake/output data recorded. Physical Exam:  Visit Vitals  /67 (BP 1 Location: Left arm, BP Patient Position: Sitting)   Pulse (!) 130   Temp (!) 102.9 °F (39.4 °C)   Resp 22   Ht 6' (1.829 m)   Wt 104.3 kg (230 lb)   SpO2 94%   BMI 31.19 kg/m²     General appearance: awake, alert, cooperative, moderate distress, appears stated age - Dry mucous membranes  Head: Normocephalic, without obvious abnormality, atraumatic  Back: symmetric, no curvature. ROM normal. No CVA tenderness. Lungs: clear to auscultation bilaterally   Heart: regular rhythm, S1, S2 normal, no murmur, click, rub or gallop. - Tachycardia  Abdomen: soft, no tenderness, no distension, normal bowel sound, no masses, no organomegaly  Extremities: atraumatic, no cyanosis - Bilateral lower limbs edema none. Skin: No rashes or ulceration.   Neurologic: Grossly intact     ECG: sinus rhythm     Data Review (Labs):   Recent Results (from the past 24 hour(s))   POC LACTIC ACID    Collection Time: 06/12/19  2:06 PM   Result Value Ref Range    Lactic Acid (POC) 1.07 0.5 - 1.9 mmol/L   CBC WITH AUTOMATED DIFF    Collection Time: 06/12/19  2:23 PM   Result Value Ref Range    WBC 7.3 4.3 - 11.1 K/uL    RBC 5.55 4.23 - 5.6 M/uL    HGB 14.9 13.6 - 17.2 g/dL    HCT 45.4 41.1 - 50.3 %    MCV 81.8 79.6 - 97.8 FL    MCH 26.8 26.1 - 32.9 PG    MCHC 32.8 31.4 - 35.0 g/dL    RDW 14.6 11.9 - 14.6 %    PLATELET 276 (L) 925 - 450 K/uL    MPV 9.0 (L) 9.4 - 12.3 FL    ABSOLUTE NRBC 0.00 0.0 - 0.2 K/uL    DF AUTOMATED      NEUTROPHILS 89 (H) 43 - 78 %    LYMPHOCYTES 5 (L) 13 - 44 %    MONOCYTES 6 4.0 - 12.0 %    EOSINOPHILS 0 (L) 0.5 - 7.8 %    BASOPHILS 0 0.0 - 2.0 %    IMMATURE GRANULOCYTES 1 0.0 - 5.0 %    ABS. NEUTROPHILS 6.5 1.7 - 8.2 K/UL    ABS. LYMPHOCYTES 0.3 (L) 0.5 - 4.6 K/UL    ABS. MONOCYTES 0.4 0.1 - 1.3 K/UL    ABS. EOSINOPHILS 0.0 0.0 - 0.8 K/UL    ABS. BASOPHILS 0.0 0.0 - 0.2 K/UL    ABS. IMM. GRANS. 0.1 0.0 - 0.5 K/UL   METABOLIC PANEL, BASIC    Collection Time: 06/12/19  2:23 PM   Result Value Ref Range    Sodium 133 (L) 136 - 145 mmol/L    Potassium 4.2 3.5 - 5.1 mmol/L    Chloride 98 98 - 107 mmol/L    CO2 25 21 - 32 mmol/L    Anion gap 10 7 - 16 mmol/L    Glucose 133 (H) 65 - 100 mg/dL    BUN 13 6 - 23 MG/DL    Creatinine 1.61 (H) 0.8 - 1.5 MG/DL    GFR est AA 58 (L) >60 ml/min/1.73m2    GFR est non-AA 48 (L) >60 ml/min/1.73m2    Calcium 9.5 8.3 - 10.4 MG/DL   PROCALCITONIN    Collection Time: 06/12/19  2:23 PM   Result Value Ref Range    Procalcitonin 15.9 ng/mL       Assessment:     Active Problems:    Sepsis ([facility] [address] (7/32/9527) dt complicated UTI in male    Hyponatremia, hypovolemic  Dehydration     Plan:     Rocephin IV  Follow cultures  IVF hydration  Blood pressure control  AM lab as needed  Continue essential home medications. Patient is full code. Further management depends on patient progress.   Thank you for the oppourtinity to contribute in the care of your patient. Time 35 minutes.     Signed By: Adama Erazo MD     June 12, 2019

## 2019-06-12 NOTE — ED PROVIDER NOTES
The patient is a 25-year-old male who presented to emergency department today complaining of fevers chills and pain with urination. The patient had an evaluation by a provider yesterday and had a positive urinary tract infection and was started on Bactrim. The patient had a rectal exam done at that visit which she said was not painful. He notes he has not had a bowel movement in 2 days which is unusual for him. The patient says that his whole body aches he's got chills and headache. He denies any focal neurologic deficits. He has a cough which is been present for the last 5 years chronically. He has tried steroids and breathing treatments without relief of his symptoms. The patient denies any abdominal discomfort except over the suprapubic area. He denies any productive cough or chest pain. Past Medical History:   Diagnosis Date    Arthritis     Chronic cough 10/2/2014    Family history of aortic aneurysm 10/2/2014    Glaucoma 2018    Hyperlipidemia     Paroxysmal tachycardia (HCC)     Post-nasal drip 10/2/2014    Psychiatric disorder     PTSD    Varicose veins of other specified sites        Past Surgical History:   Procedure Laterality Date    HX TONSILLECTOMY           Family History:   Problem Relation Age of Onset    Bleeding Prob Father        Social History     Socioeconomic History    Marital status: LEGALLY      Spouse name: Not on file    Number of children: Not on file    Years of education: Not on file    Highest education level: Not on file   Occupational History    Not on file   Social Needs    Financial resource strain: Not on file    Food insecurity:     Worry: Not on file     Inability: Not on file    Transportation needs:     Medical: Not on file     Non-medical: Not on file   Tobacco Use    Smoking status: Never Smoker    Smokeless tobacco: Never Used   Substance and Sexual Activity    Alcohol use:  Yes     Alcohol/week: 1.8 - 3.0 oz     Types: 3 - 5 Shots of liquor per week    Drug use: No     Frequency: 14.0 times per week    Sexual activity: Yes     Partners: Female   Lifestyle    Physical activity:     Days per week: Not on file     Minutes per session: Not on file    Stress: Not on file   Relationships    Social connections:     Talks on phone: Not on file     Gets together: Not on file     Attends Hindu service: Not on file     Active member of club or organization: Not on file     Attends meetings of clubs or organizations: Not on file     Relationship status: Not on file    Intimate partner violence:     Fear of current or ex partner: Not on file     Emotionally abused: Not on file     Physically abused: Not on file     Forced sexual activity: Not on file   Other Topics Concern    Not on file   Social History Narrative    Not on file         ALLERGIES: Gadobutrol and Contrast agent [iodine]    Review of Systems   All other systems reviewed and are negative. Vitals:    06/12/19 1343   BP: 117/67   Pulse: (!) 130   Resp: 22   Temp: (!) 102.9 °F (39.4 °C)   SpO2: 94%   Weight: 104.3 kg (230 lb)   Height: 6' (1.829 m)            Physical Exam     GENERAL:The patient is overweight, and well-hydrated. VITAL SIGNS: Heart rate, blood pressure, respiratory rate reviewed as recorded in  nurse's notes  EYES: Pupils reactive. Extraocular motion intact. No conjunctival redness or drainage. EARS: No external masses or lesions. NOSE: No nasal drainage or epistaxis. MOUTH/THROAT: Pharynx clear; airway patent. NECK: Supple, no meningeal signs. Trachea midline. No masses or thyromegaly. LUNGS: Breath sounds clear and equal bilaterally no accessory muscle use, cough  CHEST: No deformity  CARDIOVASCULAR: Regular rate and rhythm  ABDOMEN: Soft with tenderness over suprapubic area. No palpable masses or organomegaly. No  peritoneal signs. No rigidity. EXTREMITIES: No clubbing or cyanosis. No joint swelling. Normal muscle tone.  No  restricted range of motion appreciated. NEUROLOGIC: Sensation is grossly intact. Cranial nerve exam reveals face is  symmetrical, tongue is midline speech is clear. SKIN: No rash or petechiae. Good skin turgor palpated. PSYCHIATRIC: Alert and oriented. Appropriate behavior and judgment.       MDM  Number of Diagnoses or Management Options  Diagnosis management comments: UTI, prostatitis,    acute exacerbation asthma, COPD, CHF,     Bronchitis, aspiration pneumonia, pneumonia,    PE, rib fracture, rib dysfunction, pleurisy, pneumothorax, aspiration of foreign body         Amount and/or Complexity of Data Reviewed  Clinical lab tests: ordered and reviewed  Tests in the radiology section of CPT®: ordered and reviewed  Tests in the medicine section of CPT®: reviewed and ordered  Obtain history from someone other than the patient: yes  Review and summarize past medical records: yes  Independent visualization of images, tracings, or specimens: yes      ED Course as of Jun 12 1559 Wed Jun 12, 2019   1558 Case discussed with the hospitalist and they will come and see the patient for admission to the hospital.    [KH]      ED Course User Index  [KH] General Fling, DO       Procedures

## 2019-06-12 NOTE — PROGRESS NOTES
TRANSFER - IN REPORT:    Verbal report received from Evans Dakin, RN(name) on Imani Maricel  being received from ER(unit) for routine progression of care      Report consisted of patients Situation, Background, Assessment and   Recommendations(SBAR). Information from the following report(s) SBAR, Kardex and ED Summary was reviewed with the receiving nurse. Opportunity for questions and clarification was provided. Assessment completed upon patients arrival to unit and care assumed.

## 2019-06-12 NOTE — ED NOTES
TRANSFER - OUT REPORT:    Verbal report given to Kathi RN (name) on Kaden Akbar  being transferred to  (unit) for routine progression of care       Report consisted of patients Situation, Background, Assessment and   Recommendations(SBAR). Information from the following report(s) ED Summary was reviewed with the receiving nurse. Lines:   Peripheral IV 06/12/19 Right Arm (Active)   Site Assessment Clean, dry, & intact 6/12/2019  5:24 PM   Phlebitis Assessment 0 6/12/2019  5:24 PM   Infiltration Assessment 0 6/12/2019  5:24 PM   Dressing Status Clean, dry, & intact 6/12/2019  5:24 PM        Opportunity for questions and clarification was provided.       Patient transported with:   [employer]

## 2019-06-12 NOTE — PROGRESS NOTES
Patient admitted from ER. Pt resting in bed and is alert and oriented x 4. he denies pain and is on room air. RR even and unlabored. Patient oriented on call light, room, and staff and instructed to call for assistance if needed. Will monitor. Visitor at bedside.

## 2019-06-13 ENCOUNTER — APPOINTMENT (OUTPATIENT)
Dept: ULTRASOUND IMAGING | Age: 54
DRG: 872 | End: 2019-06-13
Attending: HOSPITALIST
Payer: COMMERCIAL

## 2019-06-13 PROBLEM — R06.2 WHEEZING: Status: ACTIVE | Noted: 2019-06-13

## 2019-06-13 PROCEDURE — 87086 URINE CULTURE/COLONY COUNT: CPT

## 2019-06-13 PROCEDURE — 74011250637 HC RX REV CODE- 250/637: Performed by: FAMILY MEDICINE

## 2019-06-13 PROCEDURE — 74011250637 HC RX REV CODE- 250/637: Performed by: HOSPITALIST

## 2019-06-13 PROCEDURE — 74011250636 HC RX REV CODE- 250/636: Performed by: HOSPITALIST

## 2019-06-13 PROCEDURE — 76770 US EXAM ABDO BACK WALL COMP: CPT

## 2019-06-13 PROCEDURE — 94760 N-INVAS EAR/PLS OXIMETRY 1: CPT

## 2019-06-13 PROCEDURE — 94640 AIRWAY INHALATION TREATMENT: CPT

## 2019-06-13 PROCEDURE — 77030020263 HC SOL INJ SOD CL0.9% LFCR 1000ML

## 2019-06-13 PROCEDURE — 74011000250 HC RX REV CODE- 250: Performed by: HOSPITALIST

## 2019-06-13 PROCEDURE — 65270000029 HC RM PRIVATE

## 2019-06-13 PROCEDURE — 74011000258 HC RX REV CODE- 258: Performed by: HOSPITALIST

## 2019-06-13 RX ORDER — ACETAMINOPHEN 325 MG/1
650 TABLET ORAL
Status: DISCONTINUED | OUTPATIENT
Start: 2019-06-13 | End: 2019-06-15 | Stop reason: HOSPADM

## 2019-06-13 RX ORDER — IPRATROPIUM BROMIDE AND ALBUTEROL SULFATE 2.5; .5 MG/3ML; MG/3ML
3 SOLUTION RESPIRATORY (INHALATION)
Status: DISCONTINUED | OUTPATIENT
Start: 2019-06-13 | End: 2019-06-15 | Stop reason: HOSPADM

## 2019-06-13 RX ORDER — BUDESONIDE 0.5 MG/2ML
500 INHALANT ORAL
Status: DISCONTINUED | OUTPATIENT
Start: 2019-06-13 | End: 2019-06-13

## 2019-06-13 RX ORDER — BUDESONIDE 0.5 MG/2ML
500 INHALANT ORAL
Status: DISCONTINUED | OUTPATIENT
Start: 2019-06-13 | End: 2019-06-15 | Stop reason: HOSPADM

## 2019-06-13 RX ORDER — CODEINE PHOSPHATE AND GUAIFENESIN 10; 100 MG/5ML; MG/5ML
10 SOLUTION ORAL
Status: DISCONTINUED | OUTPATIENT
Start: 2019-06-13 | End: 2019-06-15 | Stop reason: HOSPADM

## 2019-06-13 RX ADMIN — ASPIRIN 325 MG ORAL TABLET 325 MG: 325 PILL ORAL at 08:52

## 2019-06-13 RX ADMIN — ALPRAZOLAM 1 MG: 0.5 TABLET ORAL at 23:06

## 2019-06-13 RX ADMIN — SODIUM CHLORIDE 150 ML/HR: 900 INJECTION, SOLUTION INTRAVENOUS at 17:50

## 2019-06-13 RX ADMIN — ENOXAPARIN SODIUM 40 MG: 40 INJECTION SUBCUTANEOUS at 20:49

## 2019-06-13 RX ADMIN — CEFTRIAXONE SODIUM 2 G: 2 INJECTION, POWDER, FOR SOLUTION INTRAMUSCULAR; INTRAVENOUS at 14:55

## 2019-06-13 RX ADMIN — Medication 5 ML: at 05:53

## 2019-06-13 RX ADMIN — METHYLPREDNISOLONE SODIUM SUCCINATE 60 MG: 125 INJECTION, POWDER, FOR SOLUTION INTRAMUSCULAR; INTRAVENOUS at 23:05

## 2019-06-13 RX ADMIN — IPRATROPIUM BROMIDE AND ALBUTEROL SULFATE 3 ML: .5; 3 SOLUTION RESPIRATORY (INHALATION) at 16:37

## 2019-06-13 RX ADMIN — SODIUM CHLORIDE 150 ML/HR: 900 INJECTION, SOLUTION INTRAVENOUS at 10:30

## 2019-06-13 RX ADMIN — METHYLPREDNISOLONE SODIUM SUCCINATE 60 MG: 125 INJECTION, POWDER, FOR SOLUTION INTRAMUSCULAR; INTRAVENOUS at 14:56

## 2019-06-13 RX ADMIN — BUDESONIDE 500 MCG: 0.5 INHALANT RESPIRATORY (INHALATION) at 19:56

## 2019-06-13 RX ADMIN — ACETAMINOPHEN 650 MG: 325 TABLET, FILM COATED ORAL at 18:06

## 2019-06-13 RX ADMIN — ALPRAZOLAM 1 MG: 0.5 TABLET ORAL at 03:12

## 2019-06-13 RX ADMIN — HYDROCODONE BITARTRATE AND ACETAMINOPHEN 1 TABLET: 7.5; 325 TABLET ORAL at 04:51

## 2019-06-13 NOTE — PROGRESS NOTES
Pt has fever of 100.9. PT only has Tylenol suppository ordered. Notified Dr Arnaldo Padilla that PT does not want a suppository and has no problem swallowing pills. Md stated to order Tylenol 650mg PO Q4 hours PRN.

## 2019-06-13 NOTE — PROGRESS NOTES
Assessment done via doc flow sheet. Pt up ad karlene in room, A&)x4, resp easy & regular, lungs CTAB. Complained of generalized pain, 5/10, did not need any medicine for now. Family member at bedside. Call light within reach, pt instructed to call for assistance as needed.

## 2019-06-13 NOTE — PROGRESS NOTES
INTERNAL MEDICINE    Subjective:     Patient is a 52years old male with history of HTN and kidney stone once, presents with general fatigue up to even able to make full sentences, fever and chills for last few days. He was seen by his PCP this week and rectal exam showed non tender prostate but treated as UTI in male. The pt was severely tachycardic on arrival. He appears ill. No hx of frequent UTI, BPH or urinary incontinence. No blood in urine. Today, resp distress continues, urinary urgency, fever subsiding    Objective: Intake / Output:  No intake/output data recorded. 06/11 1901 - 06/13 0700  In: -   Out: 475 [Urine:475]    Physical Exam:  Visit Vitals  /82 (BP 1 Location: Left arm, BP Patient Position: At rest;Supine)   Pulse 98   Temp 99.3 °F (37.4 °C)   Resp 18   Ht 6' (1.829 m)   Wt 104.3 kg (230 lb)   SpO2 97%   BMI 31.19 kg/m²     General appearance: awake, alert, cooperative, resp distress, appears stated age - Dry mucous membranes  Lungs: mild exp wheez  Heart: regular rhythm, S1, S2 normal, no murmur, click, rub or gallop. Abdomen: soft, no tenderness, no distension, normal bowel sound, no masses, no organomegaly  Extremities: atraumatic, no cyanosis - Bilateral lower limbs edema none. Skin: No rashes or ulceration. Neurologic: Grossly intact     ECG: sinus rhythm     Data Review (Labs):   No results found for this or any previous visit (from the past 24 hour(s)).     Assessment:     Active Problems:    Sepsis ([address] (0/56/0573) dt complicated UTI in male    Hyponatremia, hypovolemic  Dehydration, resolved  wheezing  Plan:     Rocephin IV  Follow cultures  IVF hydration  Steroids iv and inh  BD as needed  Blood pressure control  AM lab as needed  Dispo: home when improve    Signed By: Javier Pineda MD     June 13, 2019

## 2019-06-13 NOTE — PROGRESS NOTES
Report received from [facility]. PM assessment completed. PT is AAO x 4 with normal unlabored respirations present on RA. Tele monitor is in place and IV site is CDI and infusing. Denies pain or other needs at this time. Bed is low and locked with call light in reach, will continue to monitor.

## 2019-06-13 NOTE — PROGRESS NOTES
06/12/19 2011   Dual Skin Pressure Injury Assessment   Dual Skin Pressure Injury Assessment WDL   Second Care Provider (Based on Facility Policy) Lee Jimenez RN   Skin Integumentary   Skin Integumentary (WDL) WDL   Skin Color Appropriate for ethnicity   Skin Condition/Temp Dry; Warm   Skin Integrity Tattoos (comment)

## 2019-06-13 NOTE — PROGRESS NOTES
06/13/19 1628   Oxygen Therapy   O2 Sat (%) 97 %   Pulse via Oximetry 112 beats per minute   O2 Device Room air        Pt states he is miserable. He cannot talk in full sentences without having a coughing spell. States his chest burns and abbs are sore from coughing. bs clear. Pt also states when he has a coughing fit he gets a \"specific taste\" in his mouth. Candance Huger

## 2019-06-14 LAB
ANION GAP SERPL CALC-SCNC: 6 MMOL/L (ref 7–16)
BUN SERPL-MCNC: 10 MG/DL (ref 6–23)
CALCIUM SERPL-MCNC: 9.2 MG/DL (ref 8.3–10.4)
CHLORIDE SERPL-SCNC: 105 MMOL/L (ref 98–107)
CO2 SERPL-SCNC: 26 MMOL/L (ref 21–32)
CREAT SERPL-MCNC: 1.03 MG/DL (ref 0.8–1.5)
GLUCOSE SERPL-MCNC: 215 MG/DL (ref 65–100)
POTASSIUM SERPL-SCNC: 4.2 MMOL/L (ref 3.5–5.1)
SODIUM SERPL-SCNC: 137 MMOL/L (ref 136–145)

## 2019-06-14 PROCEDURE — 74011000250 HC RX REV CODE- 250: Performed by: HOSPITALIST

## 2019-06-14 PROCEDURE — 65270000029 HC RM PRIVATE

## 2019-06-14 PROCEDURE — 94760 N-INVAS EAR/PLS OXIMETRY 1: CPT

## 2019-06-14 PROCEDURE — 74011250637 HC RX REV CODE- 250/637: Performed by: HOSPITALIST

## 2019-06-14 PROCEDURE — 94640 AIRWAY INHALATION TREATMENT: CPT

## 2019-06-14 PROCEDURE — 74011000258 HC RX REV CODE- 258: Performed by: HOSPITALIST

## 2019-06-14 PROCEDURE — 74011250636 HC RX REV CODE- 250/636: Performed by: HOSPITALIST

## 2019-06-14 PROCEDURE — 36415 COLL VENOUS BLD VENIPUNCTURE: CPT

## 2019-06-14 PROCEDURE — 80048 BASIC METABOLIC PNL TOTAL CA: CPT

## 2019-06-14 PROCEDURE — 77030020263 HC SOL INJ SOD CL0.9% LFCR 1000ML

## 2019-06-14 RX ORDER — IPRATROPIUM BROMIDE AND ALBUTEROL SULFATE 2.5; .5 MG/3ML; MG/3ML
3 SOLUTION RESPIRATORY (INHALATION)
Status: DISCONTINUED | OUTPATIENT
Start: 2019-06-14 | End: 2019-06-14

## 2019-06-14 RX ADMIN — CEFTRIAXONE SODIUM 2 G: 2 INJECTION, POWDER, FOR SOLUTION INTRAMUSCULAR; INTRAVENOUS at 15:19

## 2019-06-14 RX ADMIN — ASPIRIN 325 MG ORAL TABLET 325 MG: 325 PILL ORAL at 08:33

## 2019-06-14 RX ADMIN — Medication 5 ML: at 23:09

## 2019-06-14 RX ADMIN — METHYLPREDNISOLONE SODIUM SUCCINATE 60 MG: 125 INJECTION, POWDER, FOR SOLUTION INTRAMUSCULAR; INTRAVENOUS at 08:33

## 2019-06-14 RX ADMIN — SODIUM CHLORIDE 150 ML/HR: 900 INJECTION, SOLUTION INTRAVENOUS at 01:10

## 2019-06-14 RX ADMIN — BUDESONIDE 500 MCG: 0.5 INHALANT RESPIRATORY (INHALATION) at 08:12

## 2019-06-14 RX ADMIN — ENOXAPARIN SODIUM 40 MG: 40 INJECTION SUBCUTANEOUS at 20:24

## 2019-06-14 RX ADMIN — METHYLPREDNISOLONE SODIUM SUCCINATE 60 MG: 125 INJECTION, POWDER, FOR SOLUTION INTRAMUSCULAR; INTRAVENOUS at 23:04

## 2019-06-14 RX ADMIN — ALPRAZOLAM 1 MG: 0.5 TABLET ORAL at 23:03

## 2019-06-14 RX ADMIN — METHYLPREDNISOLONE SODIUM SUCCINATE 60 MG: 125 INJECTION, POWDER, FOR SOLUTION INTRAMUSCULAR; INTRAVENOUS at 15:20

## 2019-06-14 RX ADMIN — Medication 10 ML: at 15:19

## 2019-06-14 RX ADMIN — BUDESONIDE 500 MCG: 0.5 INHALANT RESPIRATORY (INHALATION) at 19:24

## 2019-06-14 NOTE — PROGRESS NOTES
Xanax 1 mg given for c/o anxiety. Multiple visitors at bedside. Aware to call should needs arise. Will monitor.

## 2019-06-14 NOTE — PROGRESS NOTES
Respiratory Care Services     Policy Number: [id number]    Title: Patient Care Assessment Program    Effective Date: 01/1999    Revised Date: 05/2014    Reviewed Date: 06/2013/ 03/2015, 03/2016, 06/2017     Overview  In an effort to improve quality and reduce costs of respiratory care at [facility], the Respiratory Department has developed a number of Patient Care Protocols. These protocols have been developed according to [employer] 3 and are utilized for those patients who are ordered respiratory therapy using therapeutic indications and standardized approaches for accomplishing objectives. Patient Care Protocols are intended to improve care by:   Defining the indications and standards of care agreed upon by the Pulmonary Medicine and [address] [place] [address] of [facility].  Training respiratory care practitioners to apply those criteria to individual patients and modify therapy as indicated by the protocols.  Documenting the indication and care plan as part of the initial ordering process.  Tapering or discontinuing treatments once the indication for therapy changes. The Patient Care Protocols shall be universally applied throughout the hospital as determined by   the Pulmonary Medicine and [address] [place] [address].     Rationale for Patient Care Assessment Protocols:   Continuous Quality Improvement   Cost containment   Standardization of care   Enhanced continuity of care   Utilization review   Timely intervention    The following patient care assessment protocols have been developed:   Aerosolized Medication    Bronchial Hygiene    Oxygen Weaning Protocol   Volume Expansion/Secretion Clearance Non-Vented   Ventilator Weaning Protocol   CVRU Fast Track Weaning Protocol   Asthma Treatment Protocol ER   Pediatric Asthma Treatment Protocol ER   Alpha-1 Antitrypsin Deficiency Protocol   Prone Positioning Protocol    The Director of [address] [place] [address] oversees the Patient Care Assessment Program. The Clinical/ is responsible for protocol development and training. The Supervisor is responsible for implementation and  activities. Each patient with an order for respiratory treatments will receive an evaluation. All Registered Respiratory Care Practitioners (RCPs) will perform the evaluations. The same evaluation tool will be utilized for all initial and follow-up assessments. If the patient does not meet criteria for ordered therapy, the therapy will be discontinued. If the patient demonstrates an adverse response to initially ordered therapy, the therapy will be discontinued and the physician will be contacted. Specific physician's orders that deviate from protocols and are deemed \"inappropriate\" or \"unsafe\" will be addressed with ordering physician and/or medical director as required. Patients of [facility] will not be assessed for the first 24 hours as the Medical Director of [address] [place] [address] has requested that changes in therapy should not be made during that time frame. Respiratory Patient Care Assessment Protocols                           I.  Policy: In an effort to provide quality patient care and effective utilization of services, physicians who order respiratory therapy will have their patients treated via the protocols established (see attached). All Registered [address] (RCPs) will complete the initial assessment. This assessment will indicate patient needs, and the care plan developed for the patient and will performed within 24 hours of admission. Frequency of the therapy will be set according to the results of the respiratory therapy evaluation and frequency guidelines policy.  Reassessment will be continued done every 48 hours and more frequently as needed for the individual patient. II. Purpose:     A. To provide a process that will allow for ongoing assessment and care plan modification for patients receiving respiratory services based on both objective and or subjective patient responses to interventions. This process of protocol utilization will assist in patient care progression while eliminating the need for the physician to continually update respiratory therapy orders. B. To assure continuity of respiratory care that meets [facility] Clinical Practice Guidelines. III. Initiation:  Implement Respiratory Care Protocols for patients who are ordered by physician          to receive respiratory therapy procedures or for ventilator management. IV. Protocol:  A. Upon receiving an order for therapy the RCP will review the patients EMR (electronic medical record) for all pertinent information includin. Physicians order for therapy  2. Patient history and physical examination  3. Physician progress notes  4. Diagnostic. X-rays, PFTs, arterial blood gases etc.      B. The RCP will perform a respiratory assessment in the following manner:  1. Perform hand hygiene per hospital policy utilizing Standard Precautions for all patients and following transmission-based isolation as indicated per policy. 2. Identify patient via ID bracelet verifying patient name and birth date. 3. General observations: color, pattern and effort of breathing, chest expansion, (symmetrical and bilateral), level of consciousness and the ability to ambulate. 4. The RCP will assess patients cough ability and determine if Nasotracheal suctioning is needed. If patient is unable to produce sputum, at that time, the RCP should question the patient with regard to their sputum: production, color consistency, frequency and amount.   5. Auscultation: Using a stethoscope, the RCP will listen and note quality of breath sounds and presence or absence of adventitious breath sounds in all lung fields, both anteriorly and posteriorly. 6. Upon completing the EMR review and physical assessment, the RCP will document findings in the RT Assessment section of the EMR. The score level will be provided and will be used to determine the frequency of therapy. V. Indications:   A. Indications for Bronchial Hygiene Protocol will include:  1. Potential for or presence of atelectasis. 2. Need for hydration and removal of retained secretions. 3. Need for improvement of cough effectiveness. 4. Presence of conditions associated with disorder of pulmonary clearance:  a. Cystic fibrosis  b. Bronchiectasis  B. Indications for Aerosolized Medication(s) Protocol should include:  1. Treatment of bronchospasm/wheezing  2. Improvement of mucociliary clearance  3. Treatment of stridor  4. History of Asthma or COPD             C.  Indications for Oxygen Therapy Protocol should include:  1. Documented hypoxemia  2. Severe trauma  3. Acute myocardial infarction  4. Short-term therapy (e.g. post anesthesia recovery)    VI. Maintenance:    A. Timely patient assessment is an integral part of this protocol therefore the following will be applied:  1. All non- critical care patients will be evaluated upon receiving initial respiratory care orders within 24 hours and re-evaluated within 48 hours (or more as needed). 2. Orders requesting a Respiratory Consult will be responded to in the following manner:  a. In patient emergency situations, the RCP assigned to the floor will respond immediately to the patient, provide an initial respiratory assessment, and contact the patients physician as necessary for appropriate orders. b. In non-emergent situations, the RCP assigned to the floor will respond to the patient within 90 minutes and provide an initial respiratory assessment and contact patients physician as necessary for appropriate orders. c. An RCP will provide a comprehensive assessment as soon as possible.   3. Upon completion of an evaluation, the RCP will complete documentation in the patients EMR in the RT Assessment section. 4. The RCP who completes the assessment will document orders for therapy in the orders section of the patients EMR selecting new order. Next, per protocol should be selected indicating it is a protocol order and sign orders should be selected to complete the process. 5. The Pharmacy and Therapeutics (P&T) Committee has mandated that the medication Xopenex may be changed to unit dose albuterol without an order, except for those patients receiving Xopenex due to cardiac arrhythmias. The dosage for these patients should be 0.63 mg. and may be changed from 1.25 mg. to 0.63 mg per P & T Committee by the RCP completing the assessment. 6. Patients who are not experiencing cardiac arrhythmias, and are ordered Xopenex and Atrovent may be changed to Duoneb. VII. Safety:        A. The following safety issues shall be monitored:  1. The RCP will perform hand hygiene per hospital policy utilizing Standard Precautions for all patients and following transmission-based isolation as indicated per hospital policy. 2. The RCP must exercise professional judgment in classifying the patient for frequency of therapy. 3. Appropriate classification of the patient will require an evaluation utilizing the Therapy Assessment Protocol Guidelines. 4. The RCP will confer with the physician concerning the care of the patient at any time questions or problems arise. 5. If during therapy, the patient exhibits no improvement or deterioration in clinical status the RCP will notify the physician and the patients nurse. VIII. Interventions:   A. The patients nurse is responsible concerning all items related to his/her care. Ongoing communication with nursing is essential to successful protocol management. B. The RCP recognizes the value of the team approach in meeting the patients needs.  Nursing input regarding the patients pulmonary condition will be sought as needed. IX. Reportable conditions: The RCP will inform the physician if:  1. There are acute changes in patients respiratory status. 2. The therapist is unable to determine appropriate care plan upon assessment. 3. The patient fails to reach therapeutic objective. 4. A change or additional medication is needed. X.  Patient Education:    A. Patient will receive instruction on the followin. The treatment modality, including objectives and proper technique of therapy  2. Respiratory medications  B. Documentation shall occur in the patient education section of the patients EMR. XI. Documentation: Record all findings as described above in the patients EMR. Related Protocols: A. Aerosolized Medication Protocol  B. Bronchial Hygiene  C. Oxygen Protocol   D. Volume Expansion/Secretion Clearance  E. Ventilator Weaning Protocols    References:  N   Joint Commission Consolidated [place] Standard   L    Respiratory Care Department Policy, Procedure and Protocol Guideline Manual, , CANDICE Miles. L  Therapist Driven Respiratory Care Protocols  A Practitioners Guide for Criteria-Based Respiratory Care by Rosalinda Rose M.D., and CANDICE Mcrae RRT. L  The rationale for therapist-driven protocols: an update. Respiratory Care 1998; [facility] Guidelines. Respiratory Care Services     Policy Number: [id number]    Title: Aerosolized Medication Protocol    Effective Date: 10/1998    Revised Date: 2013, 2016    Reviewed Date: 2014/ 2015 , 2017       I. Policy: The Aerosolized Medication Protocol shall by implemented by Respiratory Care              Practitioners (RCP) for patients with orders to receive aerosol therapy with medication. II. Purpose:   To open and maintain obstructed airways, the RCP, will utilize the following   protocol to select the indicated aerosolized medication(s) and determine the most effective method of delivery to the patient. III. Patient Type: All patients who are determined to meet aerosolized medication criteria as          outlined in this protocol. IV. Responsibility: Director, [address], registered Respiratory Care                                                     Practitioners (RCPs) with documented competency in the performance of                                     respiratory therapeutic techniques. V. Equipment needed:  A. Stethoscope  B. Pulse oximeter  C. IPPB machine and circuit  D. Aerosol nebulizer  E. MDI Inhaler     VI. Protocol:   A. The following conditions are accepted indications for aerosolized medication therapy. 1. Bronchospasm/wheezing  2. Impaired mucociliary clearance  3. Tracheobronchial mucosal congestion/and laryngeal stridor  4. Diseases which commonly require aerosolized medication therapy include, but are not limited to:  a. Asthma/reactive airway disease  b. Bronchitis/emphysema (COPD)  c. Cystic fibrosis  d. Severe laryngitis/tracheitis  e. Bronchiectasis  f. Smoke inhalation or chemical trauma to the lung or upper airway  g. Physical trauma to the upper airway  h. Laryngotracheobronchitis  i. Bronchiolitis  j. Non-specific wheezing              B. Indications for bronchodilator medications will include:  a. Bronchospasm/ wheezing  b. Asthma/reactive airway disease  c. Chronic obstructive pulmonary disease  d. Obstructive defect on pulmonary function testing  C. Administration of medications  1. If a bronchodilator or any other type of respiratory medication is needed, a physician order must be indicated in the medication section in the patients EMR. 2. When the physician specifies the medication and dosage at the time of request, the ordered medication will be used as part of the care plan. D.  The following guidelines will be utilized in the evaluation and selection of the appropriate delivery device for indicated medication(s):  1. IPPB  a. Ventilation is inadequate (rapid shallow breathing)  b. Refractory atelectasis has developed, other forms of therapy are unsuccessful  c. Inability to clear secretions  d. Need to improve lung expansion  2. Unassisted aerosol (UA) is the preferred method of aerosol delivery and indicated if  a. Ventilation is inadequate  b. Patient demonstrates wheezing   c. patient is unable to perform MDI effectively   d. Patient preference  3. Metered Dose Inhaler (MDI)   a. Patient is alert/cooperative  b. Medication(s) available in this delivery method. c. Able to perform 3 second breath hold. d. Patient has demonstrated ability to use MDI effectively  e. Patient has used MDI therapy previously, either at home or in the hospital.  f. Note: The only approved inhalers on formulary are albuterol and Spiriva. VII. Guidelines:   Monitor patients vital signs and evaluate patients clinical status. The need to change medication and/or modality may be indicated by:  1. A pulse greater than 120 bpm, or if a pulse increase of 20 bpm occurs with bronchodilator medications. 2. Significant worsening of dyspnea or wheezing occurring during or within 30 minutes of discontinuing therapy. 3. Worsening of patients sensorium (e.g. patient becomes confused or obtunded, and unable to follow directions). 4. Worsening of patients chest x-ray. 5. Change in sputum (e.g. increased pulmonary infiltrate, which might indicate need for volume expansion therapy). 6. Patient has difficulty coughing up secretions, which might indicate need for acetylcysteine and/or bronchial hygiene therapy. 7. Call physician immediately if dyspnea worsens and is not responsive to modifications allowed by protocol. VIII. Clinical Responsibility:  A.  The therapy assessment guidelines will be used to evaluate all patients receiving aerosolized medications with the exception of critical care areas. 1. RCPs will perform changes in therapy per protocol. 2. It will be the responsibility of RCP to provide instruction regarding respiratory medications, aerosol therapy and proper MDI technique, as well as, spacer usage to patients ordered MDI therapy. 3. Current therapy that is part of a patients home regimen will not be discontinued. IX. Documentation  A. Document assessment findings in the respiratory assessment section of the patients EMR. B. Document changes in therapy per protocol in the respiratory orders section and in the care plan section of the patients EMR. C. Document patient education in the patient education section of the patients EMR. X. Outcome Criteria:  A. Relief of wheezes and obstruction  B. Improved cough and sputum color and consistency  C. Improved chest x-ray  D. Improved arterial oxygen tension and or SaO2  E. Improved Peak Flow on asthmatic patients        XI. Related Protocols:  A. Respiratory Patient Care Protocols  B. Bronchial Hygiene Therapy  C.  Oxygen Protocol    Reference:

## 2019-06-14 NOTE — PROGRESS NOTES
INTERNAL MEDICINE    Subjective:     Patient is a 52years old male with history of HTN and kidney stone once, presents with general fatigue up to even able to make full sentences, fever and chills for last few days. He was seen by his PCP this week and rectal exam showed non tender prostate but treated as UTI in male. The pt was severely tachycardic on arrival. He appears ill. No hx of frequent UTI, BPH or urinary incontinence. No blood in urine. Today, much less resp distress continues, no fever    Objective: Intake / Output:  06/14 0701 - 06/14 1900  In: -   Out: 800 [Urine:800]  06/12 1901 - 06/14 0700  In: 2261 [I.V.:2734]  Out: 5680 [Urine:5680]    Physical Exam:  Visit Vitals  BP (!) 144/91 (BP 1 Location: Left arm, BP Patient Position: At rest;Supine)   Pulse 84   Temp 97.3 °F (36.3 °C)   Resp 18   Ht 6' (1.829 m)   Wt 104.3 kg (230 lb)   SpO2 94%   BMI 31.19 kg/m²     General appearance: awake, alert, cooperative, resp distress, appears stated age - Dry mucous membranes  Lungs: no exp wheez  Heart: regular rhythm, S1, S2 normal, no murmur, click, rub or gallop. Abdomen: soft, no tenderness, no distension, normal bowel sound, no masses, no organomegaly  Extremities: atraumatic, no cyanosis - Bilateral lower limbs edema none. Skin: No rashes or ulceration.   Neurologic: Grossly intact     ECG: sinus rhythm     Data Review (Labs):   Recent Results (from the past 24 hour(s))   METABOLIC PANEL, BASIC    Collection Time: 06/14/19  5:34 AM   Result Value Ref Range    Sodium 137 136 - 145 mmol/L    Potassium 4.2 3.5 - 5.1 mmol/L    Chloride 105 98 - 107 mmol/L    CO2 26 21 - 32 mmol/L    Anion gap 6 (L) 7 - 16 mmol/L    Glucose 215 (H) 65 - 100 mg/dL    BUN 10 6 - 23 MG/DL    Creatinine 1.03 0.8 - 1.5 MG/DL    GFR est AA >60 >60 ml/min/1.73m2    GFR est non-AA >60 >60 ml/min/1.73m2    Calcium 9.2 8.3 - 10.4 MG/DL       Assessment:     Active Problems:    Sepsis ([facility] [address] (4/16/8369) dt complicated UTI in male    Hyponatremia, hypovolemic  Dehydration, resolved  wheezing  Plan:     Rocephin IV  Follow cultures  IVF hydration  Steroids iv and inh  BD as needed  Blood pressure control  AM lab as needed  Dispo: home tomorrow    Signed By: Nikki Mixon MD     June 14, 2019

## 2019-06-14 NOTE — PROGRESS NOTES
Problem: Falls - Risk of  Goal: *Absence of Falls  Description  Document Catherene Bunting Fall Risk and appropriate interventions in the flowsheet.   Outcome: Progressing Towards Goal

## 2019-06-14 NOTE — PROGRESS NOTES
PM assessment complete. No s/s pain or distress noted. Denies needs. Visitor at bedside. Will continue to monitor through hourly rounding.

## 2019-06-14 NOTE — PROGRESS NOTES
Shift assessment completed. Pt alert and oriented x4. Respirations even and unlabored. Lung sounds clear on room air. HR regular, tele on per MD order. Abdomen distended with hypoactive bowel sounds heard in all four quadrants. Skin intact, no edema noted. Pt denies pain and nausea. No needs voiced at this time. IV patent and capped. Safety measures in place, call light within reach, family at bedside. Will continue to monitor.

## 2019-06-15 VITALS
OXYGEN SATURATION: 93 % | HEIGHT: 72 IN | DIASTOLIC BLOOD PRESSURE: 90 MMHG | TEMPERATURE: 97.3 F | BODY MASS INDEX: 31.15 KG/M2 | HEART RATE: 68 BPM | SYSTOLIC BLOOD PRESSURE: 143 MMHG | WEIGHT: 230 LBS | RESPIRATION RATE: 17 BRPM

## 2019-06-15 LAB
BACTERIA SPEC CULT: NORMAL
SERVICE CMNT-IMP: NORMAL

## 2019-06-15 PROCEDURE — 74011250637 HC RX REV CODE- 250/637: Performed by: HOSPITALIST

## 2019-06-15 PROCEDURE — 74011250636 HC RX REV CODE- 250/636: Performed by: HOSPITALIST

## 2019-06-15 RX ORDER — HYDRALAZINE HYDROCHLORIDE 25 MG/1
50 TABLET, FILM COATED ORAL
Status: DISCONTINUED | OUTPATIENT
Start: 2019-06-15 | End: 2019-06-15 | Stop reason: HOSPADM

## 2019-06-15 RX ORDER — PREDNISONE 20 MG/1
20 TABLET ORAL
Qty: 7 TAB | Refills: 0 | Status: SHIPPED | OUTPATIENT
Start: 2019-06-15 | End: 2019-06-27 | Stop reason: ALTCHOICE

## 2019-06-15 RX ORDER — ALBUTEROL SULFATE 90 UG/1
2 AEROSOL, METERED RESPIRATORY (INHALATION)
Qty: 1 INHALER | Refills: 3 | Status: SHIPPED | OUTPATIENT
Start: 2019-06-15 | End: 2019-09-05

## 2019-06-15 RX ORDER — CEFPODOXIME PROXETIL 200 MG/1
200 TABLET, FILM COATED ORAL 2 TIMES DAILY
Qty: 14 TAB | Refills: 0 | Status: SHIPPED | OUTPATIENT
Start: 2019-06-15 | End: 2019-06-27 | Stop reason: ALTCHOICE

## 2019-06-15 RX ADMIN — METHYLPREDNISOLONE SODIUM SUCCINATE 60 MG: 125 INJECTION, POWDER, FOR SOLUTION INTRAMUSCULAR; INTRAVENOUS at 07:26

## 2019-06-15 RX ADMIN — ASPIRIN 325 MG ORAL TABLET 325 MG: 325 PILL ORAL at 09:17

## 2019-06-15 RX ADMIN — Medication 5 ML: at 06:23

## 2019-06-15 NOTE — PROGRESS NOTES
Discharge instructions provided to patient and his significant other. He is aware to call for appointments with his PCP and the Pulmonologist on Monday. Patient is aware of whom to call for help in case of emergency. Opportunity to ask questions provided and answered. IV removed and patient escorted to front of hospital for discharge.

## 2019-06-15 NOTE — DISCHARGE SUMMARY
Physician Discharge Summary     Patient ID:  Brett Chávez  [id number]  52 y.o.  1965    Admit date: 6/12/2019    Discharge date: 6-    Diagnosis:  1- Acute complicated UTI in male with urinary CS growth of E coli, renal US negative. Blood CS negative. Treated with cephalosporins. Improved. 2- Acute on chronic respiratory distress, likely asthma exacerbation, patient was wheezing and has hx of allergies. His respiratory spx are for 6 years now. No hx of tobacco use. Treated successfully with steroids iv and inh, bronchodilators. To see Pulmonology as OP for PFT. 3- BLAIR, prerenal, resolved. 4- Hypovolemic hyponatremia, resolved. 5- Hypertension, controlled. Hospital course:   52years old male with history of HTN and kidney stone once, presents with general fatigue up to even able to make full sentences, fever and chills for last few days. He was seen by his PCP this week and rectal exam showed non tender prostate but treated as UTI in male. The pt was severely tachycardic on arrival. He appears ill. No hx of frequent UTI, BPH or urinary incontinence. No blood in urine. Patient admitted and treated as above. On day of discharge, patient exam showed resolution of resp spx. Lungs were clear. He felt well. PCP: Megan Bah MD    Patient Instructions:   Current Discharge Medication List      START taking these medications    Details   albuterol (PROVENTIL HFA, VENTOLIN HFA, PROAIR HFA) 90 mcg/actuation inhaler Take 2 Puffs by inhalation every four (4) hours as needed for Wheezing. Qty: 1 Inhaler, Refills: 3      budesonide (PULMICORT FLEXHALER) 90 mcg/actuation aepb inhaler Take 1 Puff by inhalation two (2) times a day. Qty: 1 Inhaler, Refills: 3      predniSONE (DELTASONE) 20 mg tablet Take 20 mg by mouth daily (with breakfast). Qty: 7 Tab, Refills: 0      cefpodoxime (VANTIN) 200 mg tablet Take 1 Tab by mouth two (2) times a day.   Qty: 14 Tab, Refills: 0         CONTINUE these medications which have NOT CHANGED    Details   ALPRAZolam (XANAX) 1 mg tablet Take 1 Tab by mouth daily as needed for Anxiety. Qty: 30 Tab, Refills: 0    Associated Diagnoses: Anxiety      celecoxib (CELEBREX) 100 mg capsule Take 1 Cap by mouth two (2) times a day for 90 days. Qty: 60 Cap, Refills: 2    Associated Diagnoses: Chronic left shoulder pain      aspirin (ASPIRIN) 325 mg tablet Take 325 mg by mouth daily. vitamin k 100 mcg tablet Take 300 mcg by mouth daily. testosterone (ANDROGEL) 20.25 mg/1.25 gram (1.62 %) gel Apply 81 mg to affected area daily. Max Daily Amount: 81 mg.  Qty: 1 Bottle, Refills: 3    Associated Diagnoses: Hypogonadism in male      ezetimibe-simvastatin (VYTORIN 10/40) 10-40 mg per tablet Take 1 Tab by mouth daily. Qty: 90 Tab, Refills: 3    Associated Diagnoses: Hyperlipidemia, unspecified hyperlipidemia type      tadalafil (CIALIS) 20 mg tablet Take 1 Tab by mouth as needed. Qty: 30 Tab, Refills: 3      omega-3 acid ethyl esters (LOVAZA) 1 gram capsule Take 2 Caps by mouth two (2) times daily (with meals). Qty: 360 Cap, Refills: 3      mometasone (NASONEX) 50 mcg/actuation nasal spray 2 Sprays by Both Nostrils route daily. Qty: 1 Container, Refills: 3      anastrozole (ARIMIDEX) 1 mg tablet Take 1 mg by mouth as needed. Sunday and Thursday       cholecalciferol, vitamin d3, (VITAMIN D3) 400 unit cap Take  by mouth. STOP taking these medications       trimethoprim-sulfamethoxazole (BACTRIM DS, SEPTRA DS) 160-800 mg per tablet Comments:   Reason for Stopping:               Instructions:     Pulmonology in 4 weeks, cardiac diet, adequate hydration, activity as tolerated    Condition: Stable  Follow-up with primary physician in 1-2 week. Time 35 min    Please send copy to primary physician.     Signed:  Robles Grajeda MD  6/15/2019  8:43 AM

## 2019-06-15 NOTE — PROGRESS NOTES
Care Management Interventions  PCP Verified by CM: Yes  Mode of Transport at Discharge: Other (see comment)  Transition of Care Consult (CM Consult): Other  Current Support Network: Own Home  Confirm Follow Up Transport: Family  Plan discussed with Pt/Family/Caregiver: Yes  Freedom of Choice Offered: Yes  Discharge Location  Discharge Placement: Home  Chart screened by  for discharge planning. No needs identified at this time. Please consult  if any new issues arise. Emailed Cite 7 Novembre, pt is not HB.

## 2019-06-15 NOTE — PROGRESS NOTES
Problem: Falls - Risk of  Goal: *Absence of Falls  Description  Document Zenaida Bunting Fall Risk and appropriate interventions in the flowsheet.   Outcome: Resolved/Met     Problem: Patient Education: Go to Patient Education Activity  Goal: Patient/Family Education  Outcome: Resolved/Met     Problem: Urinary Tract Infection - Adult  Goal: *Absence of infection signs and symptoms  Outcome: Resolved/Met     Problem: Patient Education: Go to Patient Education Activity  Goal: Patient/Family Education  Outcome: Resolved/Met

## 2019-06-15 NOTE — PROGRESS NOTES
Resting quietly, awake, resp even, unlab. Skin warm, dry. AP 72, regular, lungs sounds coarse. Assessment noted. No c/o. No distress.

## 2019-06-15 NOTE — PROGRESS NOTES
Full shift assessment completed and charted. Patient is alert and oriented x 4. He is up ad karlene and is anxious to go home. Lung sounds are coarse upon auscultation. Heart is regular in rate and rhythm, S1 & S2 auscultated, no murmurs noted. Patient with multiple tattoos. Anxious to be discharged today.

## 2019-06-15 NOTE — PROGRESS NOTES
Sitting quietly, awake with no c/o during bedside report given to Ernestina Orellana RN. No distress.

## 2019-06-17 LAB
BACTERIA SPEC CULT: NORMAL
BACTERIA SPEC CULT: NORMAL
SERVICE CMNT-IMP: NORMAL
SERVICE CMNT-IMP: NORMAL

## 2019-09-05 PROBLEM — R30.0 DYSURIA: Status: ACTIVE | Noted: 2019-09-05

## 2019-09-05 PROBLEM — R35.1 NOCTURIA MORE THAN TWICE PER NIGHT: Status: ACTIVE | Noted: 2019-09-05

## 2019-09-05 PROBLEM — N13.8 BPH WITH OBSTRUCTION/LOWER URINARY TRACT SYMPTOMS: Status: ACTIVE | Noted: 2019-09-05

## 2019-09-05 PROBLEM — N40.1 BPH WITH OBSTRUCTION/LOWER URINARY TRACT SYMPTOMS: Status: ACTIVE | Noted: 2019-09-05

## 2019-10-17 PROBLEM — R79.89 LOW TESTOSTERONE: Status: ACTIVE | Noted: 2019-10-17

## 2019-10-17 PROBLEM — N39.41 URGENCY INCONTINENCE: Status: ACTIVE | Noted: 2019-10-17

## 2020-03-18 PROBLEM — R06.2 WHEEZING: Status: RESOLVED | Noted: 2019-06-13 | Resolved: 2020-03-18

## 2020-07-30 ENCOUNTER — HOSPITAL ENCOUNTER (EMERGENCY)
Age: 55
Discharge: HOME | End: 2020-07-30
Attending: EMERGENCY MEDICINE
Payer: COMMERCIAL

## 2020-07-30 VITALS
RESPIRATION RATE: 18 BRPM | WEIGHT: 235 LBS | HEART RATE: 77 BPM | SYSTOLIC BLOOD PRESSURE: 171 MMHG | HEIGHT: 72 IN | BODY MASS INDEX: 31.83 KG/M2 | TEMPERATURE: 98 F | DIASTOLIC BLOOD PRESSURE: 98 MMHG | OXYGEN SATURATION: 96 %

## 2020-07-30 DIAGNOSIS — I10 ESSENTIAL HYPERTENSION: Primary | ICD-10-CM

## 2020-07-30 LAB
ANION GAP SERPL CALC-SCNC: 3 MMOL/L (ref 7–16)
BUN SERPL-MCNC: 10 MG/DL (ref 6–23)
CALCIUM SERPL-MCNC: 9.3 MG/DL (ref 8.3–10.4)
CHLORIDE SERPL-SCNC: 104 MMOL/L (ref 98–107)
CO2 SERPL-SCNC: 32 MMOL/L (ref 21–32)
CREAT SERPL-MCNC: 0.96 MG/DL (ref 0.8–1.5)
ERYTHROCYTE [DISTWIDTH] IN BLOOD BY AUTOMATED COUNT: 16.3 % (ref 11.9–14.6)
GLUCOSE SERPL-MCNC: 97 MG/DL (ref 65–100)
HCT VFR BLD AUTO: 56.4 % (ref 41.1–50.3)
HGB BLD-MCNC: 18.5 G/DL (ref 13.6–17.2)
MCH RBC QN AUTO: 28.6 PG (ref 26.1–32.9)
MCHC RBC AUTO-ENTMCNC: 32.8 G/DL (ref 31.4–35)
MCV RBC AUTO: 87.3 FL (ref 79.6–97.8)
NRBC # BLD: 0 K/UL (ref 0–0.2)
PLATELET # BLD AUTO: 224 K/UL (ref 150–450)
PMV BLD AUTO: 9.1 FL (ref 9.4–12.3)
POTASSIUM SERPL-SCNC: 4.1 MMOL/L (ref 3.5–5.1)
RBC # BLD AUTO: 6.46 M/UL (ref 4.23–5.6)
SODIUM SERPL-SCNC: 139 MMOL/L (ref 136–145)
WBC # BLD AUTO: 7.3 K/UL (ref 4.3–11.1)

## 2020-07-30 PROCEDURE — 85027 COMPLETE CBC AUTOMATED: CPT

## 2020-07-30 PROCEDURE — 99284 EMERGENCY DEPT VISIT MOD MDM: CPT

## 2020-07-30 PROCEDURE — 80048 BASIC METABOLIC PNL TOTAL CA: CPT

## 2020-07-30 RX ORDER — AMLODIPINE BESYLATE 5 MG/1
5 TABLET ORAL DAILY
Qty: 30 TAB | Refills: 0 | Status: SHIPPED | OUTPATIENT
Start: 2020-07-30 | End: 2020-07-30 | Stop reason: SDUPTHER

## 2020-07-30 NOTE — ED NOTES
I have reviewed discharge instructions with the patient. The patient verbalized understanding. Patient left ED via Discharge Method: ambulatory to Home with self. Opportunity for questions and clarification provided. Patient given 1 scripts. To continue your aftercare when you leave the hospital, you may receive an automated call from our care team to check in on how you are doing. This is a free service and part of our promise to provide the best care and service to meet your aftercare needs.  If you have questions, or wish to unsubscribe from this service please call [phone]. Thank you for Choosing our [address] [place] [address] Emergency Department.

## 2020-07-30 NOTE — ED PROVIDER NOTES
57-year-old gentleman presents with concerns about elevated blood pressure. He says that he was at the gym earlier today and he took his pressure on automatic machine that read he thinks 140/112. He said he then talked to 1 of the staff members at the gym who took it manually and said it was 140/108 and he was told at that point that he probably should not exercise. He says at home he checked it a couple more times and it seems to still be elevated although he does not recall the specific numbers. Associated with this he had a mild headache so he came into the emergency department for evaluation. He denies any weakness or numbness and he has no speech or balance problems. He has no nausea, vomiting, or diarrhea. He said he has no prior history of high blood pressure. His only current medication is Vytorin. No other associated symptoms. Elements of this note were created using speech recognition software. As such, errors of speech recognition may be present.            Past Medical History:   Diagnosis Date    Arthritis     Chronic cough 10/2/2014    Family history of aortic aneurysm 10/2/2014    Glaucoma 2018    Hyperlipidemia     Paroxysmal tachycardia (HCC)     Post-nasal drip 10/2/2014    Psychiatric disorder     PTSD    Varicose veins of other specified sites        Past Surgical History:   Procedure Laterality Date    HX TONSILLECTOMY           Family History:   Problem Relation Age of Onset    Bleeding Prob Father        Social History     Socioeconomic History    Marital status: LEGALLY      Spouse name: Not on file    Number of children: Not on file    Years of education: Not on file    Highest education level: Not on file   Occupational History    Not on file   Social Needs    Financial resource strain: Not on file    Food insecurity     Worry: Not on file     Inability: Not on file    Transportation needs     Medical: Not on file     Non-medical: Not on file Tobacco Use    Smoking status: Never Smoker    Smokeless tobacco: Never Used   Substance and Sexual Activity    Alcohol use: Yes     Alcohol/week: 3.0 - 5.0 standard drinks     Types: 3 - 5 Shots of liquor per week    Drug use: No     Frequency: 14.0 times per week    Sexual activity: Yes     Partners: Female   Lifestyle    Physical activity     Days per week: Not on file     Minutes per session: Not on file    Stress: Not on file   Relationships    Social connections     Talks on phone: Not on file     Gets together: Not on file     Attends Anabaptist service: Not on file     Active member of club or organization: Not on file     Attends meetings of clubs or organizations: Not on file     Relationship status: Not on file    Intimate partner violence     Fear of current or ex partner: Not on file     Emotionally abused: Not on file     Physically abused: Not on file     Forced sexual activity: Not on file   Other Topics Concern    Not on file   Social History Narrative    Retired . He lives with his girlfriend and has a dog. He worked a  years ago and may have had asbestos exposure. ALLERGIES: Gadobutrol and Contrast agent [iodine]    Review of Systems   Constitutional: Negative for chills, diaphoresis and fever. HENT: Negative for congestion, rhinorrhea and sore throat. Eyes: Negative for redness and visual disturbance. Respiratory: Negative for cough, chest tightness, shortness of breath and wheezing. Cardiovascular: Negative for chest pain and palpitations. Gastrointestinal: Negative for abdominal pain, blood in stool, diarrhea, nausea and vomiting. Endocrine: Negative for polydipsia and polyuria. Genitourinary: Negative for dysuria and hematuria. Musculoskeletal: Negative for arthralgias, myalgias and neck stiffness. Skin: Negative for rash. Allergic/Immunologic: Negative for environmental allergies and food allergies.    Neurological: Positive for headaches. Negative for dizziness and weakness. Hematological: Negative for adenopathy. Does not bruise/bleed easily. Psychiatric/Behavioral: Negative for confusion and sleep disturbance. The patient is not nervous/anxious. There were no vitals filed for this visit. Physical Exam  Vitals signs and nursing note reviewed. Constitutional:       Appearance: Normal appearance. HENT:      Head: Normocephalic and atraumatic. Cardiovascular:      Rate and Rhythm: Normal rate and regular rhythm. Skin:     General: Skin is warm. Neurological:      General: No focal deficit present. Mental Status: He is alert and oriented to person, place, and time. MDM  Number of Diagnoses or Management Options  Diagnosis management comments: Patient's initial pressure was 190/120. I will check his basic blood work to look at his kidney function. We will recheck his pressure and consider treatment pending those results.          Procedures

## 2020-07-30 NOTE — DISCHARGE INSTRUCTIONS
Return with any fevers, vomiting, difficulty breathing, worsening symptoms, or additional concerns. Follow-up with your primary care doctor for reevaluation of your blood pressure and your hemoglobin.

## 2020-08-05 PROBLEM — N52.8 OTHER MALE ERECTILE DYSFUNCTION: Status: ACTIVE | Noted: 2020-08-05

## 2020-09-10 PROBLEM — J45.20 MILD INTERMITTENT ASTHMA WITHOUT COMPLICATION: Status: ACTIVE | Noted: 2020-09-10

## 2020-12-09 PROBLEM — R07.89 CHEST DISCOMFORT: Status: ACTIVE | Noted: 2020-12-09

## 2020-12-24 ENCOUNTER — HOSPITAL ENCOUNTER (OUTPATIENT)
Age: 55
Setting detail: OBSERVATION
Discharge: HOME | End: 2020-12-25
Attending: EMERGENCY MEDICINE | Admitting: INTERNAL MEDICINE
Payer: COMMERCIAL

## 2020-12-24 ENCOUNTER — APPOINTMENT (OUTPATIENT)
Dept: GENERAL RADIOLOGY | Age: 55
End: 2020-12-24
Attending: EMERGENCY MEDICINE
Payer: COMMERCIAL

## 2020-12-24 DIAGNOSIS — I20.0 UNSTABLE ANGINA PECTORIS (HCC): ICD-10-CM

## 2020-12-24 PROBLEM — I25.760 CORONARY ARTERY DISEASE INVOLVING BYPASS GRAFT OF TRANSPLANTED HEART WITH UNSTABLE ANGINA PECTORIS (HCC): Status: ACTIVE | Noted: 2020-12-24

## 2020-12-24 PROBLEM — I25.110 CORONARY ARTERY DISEASE INVOLVING NATIVE CORONARY ARTERY OF NATIVE HEART WITH UNSTABLE ANGINA PECTORIS (HCC): Status: ACTIVE | Noted: 2020-12-24

## 2020-12-24 LAB
ALBUMIN SERPL-MCNC: 4.2 G/DL (ref 3.5–5)
ALBUMIN/GLOB SERPL: 1.1 {RATIO} (ref 1.2–3.5)
ALP SERPL-CCNC: 107 U/L (ref 50–136)
ALT SERPL-CCNC: 55 U/L (ref 12–65)
ANION GAP SERPL CALC-SCNC: 6 MMOL/L (ref 7–16)
AST SERPL-CCNC: 38 U/L (ref 15–37)
ATRIAL RATE: 81 BPM
BASOPHILS # BLD: 0.1 K/UL (ref 0–0.2)
BASOPHILS NFR BLD: 1 % (ref 0–2)
BILIRUB SERPL-MCNC: 0.7 MG/DL (ref 0.2–1.1)
BUN SERPL-MCNC: 10 MG/DL (ref 6–23)
CALCIUM SERPL-MCNC: 9.7 MG/DL (ref 8.3–10.4)
CALCULATED P AXIS, ECG09: 43 DEGREES
CALCULATED R AXIS, ECG10: 1 DEGREES
CALCULATED T AXIS, ECG11: 26 DEGREES
CHLORIDE SERPL-SCNC: 103 MMOL/L (ref 98–107)
CO2 SERPL-SCNC: 28 MMOL/L (ref 21–32)
CREAT SERPL-MCNC: 1.02 MG/DL (ref 0.8–1.5)
DIAGNOSIS, 93000: NORMAL
DIFFERENTIAL METHOD BLD: ABNORMAL
EOSINOPHIL # BLD: 0.3 K/UL (ref 0–0.8)
EOSINOPHIL NFR BLD: 4 % (ref 0.5–7.8)
ERYTHROCYTE [DISTWIDTH] IN BLOOD BY AUTOMATED COUNT: 11.9 % (ref 11.9–14.6)
GLOBULIN SER CALC-MCNC: 3.7 G/DL (ref 2.3–3.5)
GLUCOSE SERPL-MCNC: 127 MG/DL (ref 65–100)
HCT VFR BLD AUTO: 46.1 % (ref 41.1–50.3)
HGB BLD-MCNC: 15.9 G/DL (ref 13.6–17.2)
IMM GRANULOCYTES # BLD AUTO: 0 K/UL (ref 0–0.5)
IMM GRANULOCYTES NFR BLD AUTO: 1 % (ref 0–5)
LYMPHOCYTES # BLD: 1.8 K/UL (ref 0.5–4.6)
LYMPHOCYTES NFR BLD: 25 % (ref 13–44)
MAGNESIUM SERPL-MCNC: 2.3 MG/DL (ref 1.8–2.4)
MCH RBC QN AUTO: 30.9 PG (ref 26.1–32.9)
MCHC RBC AUTO-ENTMCNC: 34.5 G/DL (ref 31.4–35)
MCV RBC AUTO: 89.5 FL (ref 79.6–97.8)
MONOCYTES # BLD: 0.6 K/UL (ref 0.1–1.3)
MONOCYTES NFR BLD: 8 % (ref 4–12)
NEUTS SEG # BLD: 4.5 K/UL (ref 1.7–8.2)
NEUTS SEG NFR BLD: 62 % (ref 43–78)
NRBC # BLD: 0 K/UL (ref 0–0.2)
P-R INTERVAL, ECG05: 214 MS
PLATELET # BLD AUTO: 236 K/UL (ref 150–450)
PMV BLD AUTO: 8.9 FL (ref 9.4–12.3)
POTASSIUM SERPL-SCNC: 3.7 MMOL/L (ref 3.5–5.1)
PROT SERPL-MCNC: 7.9 G/DL (ref 6.3–8.2)
Q-T INTERVAL, ECG07: 368 MS
QRS DURATION, ECG06: 90 MS
QTC CALCULATION (BEZET), ECG08: 427 MS
RBC # BLD AUTO: 5.15 M/UL (ref 4.23–5.6)
SODIUM SERPL-SCNC: 137 MMOL/L (ref 138–145)
TROPONIN-HIGH SENSITIVITY: 12.2 PG/ML (ref 0–14)
TROPONIN-HIGH SENSITIVITY: 13.7 PG/ML (ref 0–14)
VENTRICULAR RATE, ECG03: 81 BPM
WBC # BLD AUTO: 7.2 K/UL (ref 4.3–11.1)

## 2020-12-24 PROCEDURE — 83735 ASSAY OF MAGNESIUM: CPT

## 2020-12-24 PROCEDURE — 74011250637 HC RX REV CODE- 250/637: Performed by: PHYSICIAN ASSISTANT

## 2020-12-24 PROCEDURE — 77030015766

## 2020-12-24 PROCEDURE — 93458 L HRT ARTERY/VENTRICLE ANGIO: CPT | Performed by: INTERNAL MEDICINE

## 2020-12-24 PROCEDURE — 92928 PRQ TCAT PLMT NTRAC ST 1 LES: CPT

## 2020-12-24 PROCEDURE — 85025 COMPLETE CBC W/AUTO DIFF WBC: CPT

## 2020-12-24 PROCEDURE — 92928 PRQ TCAT PLMT NTRAC ST 1 LES: CPT | Performed by: INTERNAL MEDICINE

## 2020-12-24 PROCEDURE — 99152 MOD SED SAME PHYS/QHP 5/>YRS: CPT | Performed by: INTERNAL MEDICINE

## 2020-12-24 PROCEDURE — C1874 STENT, COATED/COV W/DEL SYS: HCPCS

## 2020-12-24 PROCEDURE — 74011250636 HC RX REV CODE- 250/636: Performed by: INTERNAL MEDICINE

## 2020-12-24 PROCEDURE — 96360 HYDRATION IV INFUSION INIT: CPT

## 2020-12-24 PROCEDURE — 99153 MOD SED SAME PHYS/QHP EA: CPT

## 2020-12-24 PROCEDURE — 74011000250 HC RX REV CODE- 250: Performed by: INTERNAL MEDICINE

## 2020-12-24 PROCEDURE — 99152 MOD SED SAME PHYS/QHP 5/>YRS: CPT

## 2020-12-24 PROCEDURE — 74011000258 HC RX REV CODE- 258: Performed by: INTERNAL MEDICINE

## 2020-12-24 PROCEDURE — 99285 EMERGENCY DEPT VISIT HI MDM: CPT

## 2020-12-24 PROCEDURE — 94760 N-INVAS EAR/PLS OXIMETRY 1: CPT

## 2020-12-24 PROCEDURE — 71046 X-RAY EXAM CHEST 2 VIEWS: CPT

## 2020-12-24 PROCEDURE — C1769 GUIDE WIRE: HCPCS

## 2020-12-24 PROCEDURE — 99218 HC RM OBSERVATION: CPT

## 2020-12-24 PROCEDURE — C1894 INTRO/SHEATH, NON-LASER: HCPCS

## 2020-12-24 PROCEDURE — 99220 PR INITIAL OBSERVATION CARE/DAY 70 MINUTES: CPT | Performed by: INTERNAL MEDICINE

## 2020-12-24 PROCEDURE — 80053 COMPREHEN METABOLIC PANEL: CPT

## 2020-12-24 PROCEDURE — 74011000636 HC RX REV CODE- 636: Performed by: INTERNAL MEDICINE

## 2020-12-24 PROCEDURE — 93458 L HRT ARTERY/VENTRICLE ANGIO: CPT

## 2020-12-24 PROCEDURE — C1725 CATH, TRANSLUMIN NON-LASER: HCPCS

## 2020-12-24 PROCEDURE — 77030016699 HC CATH ANGI DX INFN1 CARD -A

## 2020-12-24 PROCEDURE — 74011250637 HC RX REV CODE- 250/637: Performed by: INTERNAL MEDICINE

## 2020-12-24 PROCEDURE — 85347 COAGULATION TIME ACTIVATED: CPT

## 2020-12-24 PROCEDURE — 74011250636 HC RX REV CODE- 250/636: Performed by: PHYSICIAN ASSISTANT

## 2020-12-24 PROCEDURE — 93005 ELECTROCARDIOGRAM TRACING: CPT | Performed by: EMERGENCY MEDICINE

## 2020-12-24 PROCEDURE — 84484 ASSAY OF TROPONIN QUANT: CPT

## 2020-12-24 PROCEDURE — 77030042317 HC BND COMPR HEMSTAT -B

## 2020-12-24 PROCEDURE — C1887 CATHETER, GUIDING: HCPCS

## 2020-12-24 RX ORDER — IPRATROPIUM BROMIDE 42 UG/1
2 SPRAY, METERED NASAL 4 TIMES DAILY
Status: DISCONTINUED | OUTPATIENT
Start: 2020-12-24 | End: 2020-12-25 | Stop reason: HOSPADM

## 2020-12-24 RX ORDER — SODIUM CHLORIDE 0.9 % (FLUSH) 0.9 %
5-40 SYRINGE (ML) INJECTION AS NEEDED
Status: DISCONTINUED | OUTPATIENT
Start: 2020-12-24 | End: 2020-12-25 | Stop reason: HOSPADM

## 2020-12-24 RX ORDER — PANTOPRAZOLE SODIUM 40 MG/1
40 TABLET, DELAYED RELEASE ORAL
Qty: 30 TAB | Refills: 5 | Status: SHIPPED | OUTPATIENT
Start: 2020-12-25 | End: 2020-12-25

## 2020-12-24 RX ORDER — FENTANYL CITRATE 50 UG/ML
25-50 INJECTION, SOLUTION INTRAMUSCULAR; INTRAVENOUS
Status: DISCONTINUED | OUTPATIENT
Start: 2020-12-24 | End: 2020-12-25 | Stop reason: HOSPADM

## 2020-12-24 RX ORDER — DIPHENHYDRAMINE HYDROCHLORIDE 50 MG/ML
50 INJECTION, SOLUTION INTRAMUSCULAR; INTRAVENOUS AS NEEDED
Status: DISCONTINUED | OUTPATIENT
Start: 2020-12-24 | End: 2020-12-25 | Stop reason: HOSPADM

## 2020-12-24 RX ORDER — SODIUM CHLORIDE 0.9 % (FLUSH) 0.9 %
5-40 SYRINGE (ML) INJECTION EVERY 8 HOURS
Status: DISCONTINUED | OUTPATIENT
Start: 2020-12-24 | End: 2020-12-25 | Stop reason: HOSPADM

## 2020-12-24 RX ORDER — SODIUM CHLORIDE 0.9 % (FLUSH) 0.9 %
5-40 SYRINGE (ML) INJECTION EVERY 8 HOURS
Status: CANCELLED | OUTPATIENT
Start: 2020-12-24

## 2020-12-24 RX ORDER — MORPHINE SULFATE 2 MG/ML
2 INJECTION, SOLUTION INTRAMUSCULAR; INTRAVENOUS
Status: CANCELLED | OUTPATIENT
Start: 2020-12-24

## 2020-12-24 RX ORDER — HEPARIN SODIUM 200 [USP'U]/100ML
3 INJECTION, SOLUTION INTRAVENOUS CONTINUOUS
Status: DISCONTINUED | OUTPATIENT
Start: 2020-12-24 | End: 2020-12-25 | Stop reason: HOSPADM

## 2020-12-24 RX ORDER — NITROGLYCERIN 0.4 MG/1
0.4 TABLET SUBLINGUAL
Qty: 1 BOTTLE | Refills: 4 | Status: SHIPPED | OUTPATIENT
Start: 2020-12-24

## 2020-12-24 RX ORDER — METOPROLOL TARTRATE 25 MG/1
25 TABLET, FILM COATED ORAL 2 TIMES DAILY
Status: DISCONTINUED | OUTPATIENT
Start: 2020-12-24 | End: 2020-12-25 | Stop reason: HOSPADM

## 2020-12-24 RX ORDER — ONDANSETRON 2 MG/ML
4 INJECTION INTRAMUSCULAR; INTRAVENOUS
Status: DISCONTINUED | OUTPATIENT
Start: 2020-12-24 | End: 2020-12-25 | Stop reason: HOSPADM

## 2020-12-24 RX ORDER — MIDAZOLAM HYDROCHLORIDE 1 MG/ML
.5-2 INJECTION, SOLUTION INTRAMUSCULAR; INTRAVENOUS
Status: DISCONTINUED | OUTPATIENT
Start: 2020-12-24 | End: 2020-12-25 | Stop reason: HOSPADM

## 2020-12-24 RX ORDER — ALPRAZOLAM 0.5 MG/1
1 TABLET ORAL
Status: DISCONTINUED | OUTPATIENT
Start: 2020-12-24 | End: 2020-12-25 | Stop reason: HOSPADM

## 2020-12-24 RX ORDER — MORPHINE SULFATE 2 MG/ML
2 INJECTION, SOLUTION INTRAMUSCULAR; INTRAVENOUS
Status: DISCONTINUED | OUTPATIENT
Start: 2020-12-24 | End: 2020-12-25 | Stop reason: HOSPADM

## 2020-12-24 RX ORDER — ATORVASTATIN CALCIUM 40 MG/1
40 TABLET, FILM COATED ORAL
Status: DISCONTINUED | OUTPATIENT
Start: 2020-12-24 | End: 2020-12-25 | Stop reason: HOSPADM

## 2020-12-24 RX ORDER — LIDOCAINE HYDROCHLORIDE 10 MG/ML
10-30 INJECTION, SOLUTION EPIDURAL; INFILTRATION; INTRACAUDAL; PERINEURAL ONCE
Status: COMPLETED | OUTPATIENT
Start: 2020-12-24 | End: 2020-12-24

## 2020-12-24 RX ORDER — NITROGLYCERIN 0.4 MG/1
0.4 TABLET SUBLINGUAL
Status: CANCELLED | OUTPATIENT
Start: 2020-12-24

## 2020-12-24 RX ORDER — AMLODIPINE BESYLATE 10 MG/1
5 TABLET ORAL DAILY
Status: DISCONTINUED | OUTPATIENT
Start: 2020-12-25 | End: 2020-12-24

## 2020-12-24 RX ORDER — GUAIFENESIN 100 MG/5ML
81 LIQUID (ML) ORAL DAILY
Status: DISCONTINUED | OUTPATIENT
Start: 2020-12-25 | End: 2020-12-25 | Stop reason: HOSPADM

## 2020-12-24 RX ORDER — PRASUGREL 10 MG/1
10 TABLET, FILM COATED ORAL DAILY
Qty: 30 TAB | Refills: 11 | Status: SHIPPED | OUTPATIENT
Start: 2020-12-25 | End: 2021-04-09 | Stop reason: SDUPTHER

## 2020-12-24 RX ORDER — GUAIFENESIN 100 MG/5ML
81 LIQUID (ML) ORAL DAILY
Status: CANCELLED | OUTPATIENT
Start: 2020-12-25

## 2020-12-24 RX ORDER — METOPROLOL TARTRATE 25 MG/1
25 TABLET, FILM COATED ORAL 2 TIMES DAILY
Qty: 60 TAB | Refills: 5 | Status: SHIPPED | OUTPATIENT
Start: 2020-12-25 | End: 2020-12-25

## 2020-12-24 RX ORDER — PRASUGREL 10 MG/1
60 TABLET, FILM COATED ORAL ONCE
Status: COMPLETED | OUTPATIENT
Start: 2020-12-24 | End: 2020-12-24

## 2020-12-24 RX ORDER — SODIUM CHLORIDE 0.9 % (FLUSH) 0.9 %
5-40 SYRINGE (ML) INJECTION AS NEEDED
Status: CANCELLED | OUTPATIENT
Start: 2020-12-24

## 2020-12-24 RX ORDER — AMLODIPINE BESYLATE 5 MG/1
5 TABLET ORAL DAILY
Status: DISCONTINUED | OUTPATIENT
Start: 2020-12-24 | End: 2020-12-25 | Stop reason: HOSPADM

## 2020-12-24 RX ORDER — PANTOPRAZOLE SODIUM 40 MG/1
40 TABLET, DELAYED RELEASE ORAL
Status: DISCONTINUED | OUTPATIENT
Start: 2020-12-25 | End: 2020-12-25 | Stop reason: HOSPADM

## 2020-12-24 RX ORDER — ACETAMINOPHEN 325 MG/1
650 TABLET ORAL
Status: CANCELLED | OUTPATIENT
Start: 2020-12-24

## 2020-12-24 RX ORDER — NITROGLYCERIN 0.4 MG/1
0.4 TABLET SUBLINGUAL
Status: DISCONTINUED | OUTPATIENT
Start: 2020-12-24 | End: 2020-12-25 | Stop reason: HOSPADM

## 2020-12-24 RX ORDER — SODIUM CHLORIDE 9 MG/ML
100 INJECTION, SOLUTION INTRAVENOUS CONTINUOUS
Status: DISCONTINUED | OUTPATIENT
Start: 2020-12-24 | End: 2020-12-24

## 2020-12-24 RX ORDER — HYDROCODONE BITARTRATE AND ACETAMINOPHEN 5; 325 MG/1; MG/1
1 TABLET ORAL
Status: CANCELLED | OUTPATIENT
Start: 2020-12-24

## 2020-12-24 RX ORDER — GUAIFENESIN 100 MG/5ML
81 LIQUID (ML) ORAL DAILY
Status: SHIPPED | COMMUNITY
Start: 2020-12-25

## 2020-12-24 RX ORDER — SODIUM CHLORIDE 9 MG/ML
75 INJECTION, SOLUTION INTRAVENOUS CONTINUOUS
Status: CANCELLED | OUTPATIENT
Start: 2020-12-24

## 2020-12-24 RX ORDER — PRASUGREL 10 MG/1
10 TABLET, FILM COATED ORAL DAILY
Status: DISCONTINUED | OUTPATIENT
Start: 2020-12-25 | End: 2020-12-25 | Stop reason: HOSPADM

## 2020-12-24 RX ORDER — PRASUGREL 10 MG/1
10 TABLET, FILM COATED ORAL DAILY
Status: CANCELLED | OUTPATIENT
Start: 2020-12-25

## 2020-12-24 RX ORDER — HYDROCORTISONE SODIUM SUCCINATE 100 MG/2ML
100 INJECTION, POWDER, FOR SOLUTION INTRAMUSCULAR; INTRAVENOUS ONCE
Status: COMPLETED | OUTPATIENT
Start: 2020-12-24 | End: 2020-12-24

## 2020-12-24 RX ADMIN — SODIUM CHLORIDE 100 ML/HR: 900 INJECTION, SOLUTION INTRAVENOUS at 16:58

## 2020-12-24 RX ADMIN — Medication 10 ML: at 21:44

## 2020-12-24 RX ADMIN — BIVALIRUDIN 1.75 MG/KG/HR: 250 INJECTION, POWDER, LYOPHILIZED, FOR SOLUTION INTRAVENOUS at 19:10

## 2020-12-24 RX ADMIN — Medication 10 ML: at 17:45

## 2020-12-24 RX ADMIN — HEPARIN SODIUM 3 UNITS/HR: 200 INJECTION, SOLUTION INTRAVENOUS at 18:54

## 2020-12-24 RX ADMIN — HEPARIN SODIUM 2 ML: 10000 INJECTION INTRAVENOUS; SUBCUTANEOUS at 18:54

## 2020-12-24 RX ADMIN — ALPRAZOLAM 1 MG: 0.5 TABLET ORAL at 17:42

## 2020-12-24 RX ADMIN — ATORVASTATIN CALCIUM 40 MG: 40 TABLET, FILM COATED ORAL at 21:39

## 2020-12-24 RX ADMIN — IOPAMIDOL 280 ML: 755 INJECTION, SOLUTION INTRAVENOUS at 19:33

## 2020-12-24 RX ADMIN — AMLODIPINE BESYLATE 5 MG: 5 TABLET ORAL at 17:42

## 2020-12-24 RX ADMIN — Medication 10 ML: at 21:41

## 2020-12-24 RX ADMIN — HYDROCORTISONE SODIUM SUCCINATE 100 MG: 100 INJECTION, POWDER, FOR SOLUTION INTRAMUSCULAR; INTRAVENOUS at 18:53

## 2020-12-24 RX ADMIN — PRASUGREL HYDROCHLORIDE 60 MG: 10 TABLET, FILM COATED ORAL at 19:29

## 2020-12-24 RX ADMIN — MIDAZOLAM 3 MG: 1 INJECTION INTRAMUSCULAR; INTRAVENOUS at 19:32

## 2020-12-24 RX ADMIN — LIDOCAINE HYDROCHLORIDE 10 ML: 10 INJECTION, SOLUTION EPIDURAL; INFILTRATION; INTRACAUDAL; PERINEURAL at 18:54

## 2020-12-24 RX ADMIN — DIPHENHYDRAMINE HYDROCHLORIDE 50 MG: 50 INJECTION, SOLUTION INTRAMUSCULAR; INTRAVENOUS at 18:53

## 2020-12-24 RX ADMIN — FAMOTIDINE 20 MG: 10 INJECTION INTRAVENOUS at 18:51

## 2020-12-24 RX ADMIN — FENTANYL CITRATE 75 MCG: 50 INJECTION, SOLUTION INTRAMUSCULAR; INTRAVENOUS at 19:32

## 2020-12-24 RX ADMIN — METOPROLOL TARTRATE 25 MG: 25 TABLET, FILM COATED ORAL at 17:42

## 2020-12-24 NOTE — PROGRESS NOTES
[facility] with Dr. Shankar Lloyd  Right radial access  Stent of RCA  Versed 3mg (1900)  Fentanyl 75mcg  Angiomax bolus of 16ml/hr given at [address] drip of 37ml/hr started at 1907   @ 19:12  effiant 60mg po @ 19:29  TR Band in place @ 19:29 with 12ml in the band    TRANSFER - OUT REPORT:    Verbal report given to cpru RN(name) on Robert Seen. being transferred to cpru(unit) for routine progression of care       Report consisted of patients Situation, Background, Assessment and   Recommendations(SBAR). Information from the following report(s) SBAR, Kardex, Procedure Summary and MAR was reviewed with the receiving nurse. Opportunity for questions and clarification was provided.       Patient transported with:   [facility]

## 2020-12-24 NOTE — ED TRIAGE NOTES
Pt reports left sided chest pressure onset this morning, increased with activity. States that he had a negative stress test Friday. Reports started on Imdur without relief of symptoms.

## 2020-12-24 NOTE — CONSULTS
[facility] ER Consultation        Date of  Admission: 12/24/2020  1:54 PM     Primary Care Physician: Dr. Theodore Kelly  Primary Cardiologist: Dr. Ese Arenas  Referring Physician: Dr. Naresh Finn Physician: Dr. Ken Lazar    CC/Reason for consult: chest discomfort    HPI:  Nataliya David is a 60 y.o. male with h/o HTN, dyslipidemia, mild asthma and anxiety who presents to [facility] ED with c/o chest pain with exertion. He reports 3 weeks ago he started having CP after walking on treadmill that occurs each time he walks for 3 minutes. He reports radiation to back with associated SOB but denies palpitations, N/V diaphoresis or syncope. He saw Dr. Ese Arenas who ordered a exercise nuclear stress test. During the test he had CP and it was changed to [address] [place] [address]. He talked with Dr. Ese Arenas and was told it was normal. He had a LHC last year 4/2019 due to elevated Calcium score. Cath showed normal EF 60%, LAD with mid intramyocardial bridge, RI 20% and RCA 20%- non obstructive CAD. He presented to ED today after having CP at rest this AM and recurrent CP while walking outside. In ED, BP elevated at 172/111, HS troponin 12.2. [facility] asked to see Pt to evaluate chest discomfort. Past Medical History:   Diagnosis Date    Arthritis     Chronic cough 10/2/2014    Family history of aortic aneurysm 10/2/2014    Glaucoma 2018    Hyperlipidemia     Mild intermittent asthma without complication 2/62/5406    Paroxysmal tachycardia (HCC)     Post-nasal drip 10/2/2014    Psychiatric disorder     PTSD    Varicose veins of other specified sites       Past Surgical History:   Procedure Laterality Date    HX TONSILLECTOMY         Allergies   Allergen Reactions    Gadobutrol Hives and Itching     Pt.  Broke out in hives,itching all over his body and throat feeling itchy and tight    Contrast Agent [Iodine] Rash      Social History     Socioeconomic History    Marital status: LEGALLY  Spouse name: Not on file    Number of children: Not on file    Years of education: Not on file    Highest education level: Not on file   Occupational History    Not on file   Social Needs    Financial resource strain: Not on file    Food insecurity     Worry: Not on file     Inability: Not on file    Transportation needs     Medical: Not on file     Non-medical: Not on file   Tobacco Use    Smoking status: Never Smoker    Smokeless tobacco: Never Used   Substance and Sexual Activity    Alcohol use: Yes     Alcohol/week: 3.0 - 5.0 standard drinks     Types: 3 - 5 Shots of liquor per week    Drug use: No     Frequency: 14.0 times per week    Sexual activity: Yes     Partners: Female   Lifestyle    Physical activity     Days per week: Not on file     Minutes per session: Not on file    Stress: Not on file   Relationships    Social connections     Talks on phone: Not on file     Gets together: Not on file     Attends Oriental orthodox service: Not on file     Active member of club or organization: Not on file     Attends meetings of clubs or organizations: Not on file     Relationship status: Not on file    Intimate partner violence     Fear of current or ex partner: Not on file     Emotionally abused: Not on file     Physically abused: Not on file     Forced sexual activity: Not on file   Other Topics Concern    Not on file   Social History Narrative    Retired . He lives with his girlfriend and has a dog. He worked a  years ago and may have had asbestos exposure. Family History   Problem Relation Age of Onset    Bleeding Prob Father         No current facility-administered medications for this encounter. Current Outpatient Medications   Medication Sig    isosorbide mononitrate ER (IMDUR) 30 mg tablet Take 1 Tab by mouth every morning.  ipratropium (ATROVENT) 42 mcg (0.06 %) nasal spray 2 Sprays by Both Nostrils route four (4) times daily.     amLODIPine (NORVASC) 5 mg tablet Take 1 Tab by mouth daily.  ezetimibe-simvastatin (Vytorin 10/40) 10-40 mg per tablet Take 1 Tab by mouth daily.  testosterone (ANDROGEL) 1 % (50 mg/5 gram) glpk 1 Packet by TransDERmal route daily. Max Daily Amount: 50 mg.    ALPRAZolam (Xanax) 1 mg tablet Take 1 Tab by mouth daily as needed for Anxiety.  aspirin (ASPIRIN) 325 mg tablet Take 325 mg by mouth daily.  fluticasone propionate (FLONASE) 50 mcg/actuation nasal spray 2 Sprays by Both Nostrils route daily.  vitamin k 100 mcg tablet Take 300 mcg by mouth daily.  cholecalciferol, vitamin d3, (VITAMIN D3) 400 unit cap Take  by mouth.        Review of symptoms:  General: no recent weight loss/gain, weakness, fatigue, fever or chills   Skin: no rashes, lumps, or other skin changes   HEENT: no headache, dizziness, lightheadedness, vision changes, hearing changes, tinnitus, vertigo, sinus pressure/pain, bleeding gums, sore throat, or hoarseness   Neck: no swollen glands, goiter, pain or stiffness   Respiratory: no cough, sputum, hemoptysis, dyspnea, wheezing   Cardiovascular: +chest pain or discomfort, palpitations, +dyspnea, orthopnea, paroxysmal nocturnal dyspnea, peripheral edema   Gastrointestinal: no trouble swallowing, heartburn, change of appetite, nausea, change in bowel habits, pain with defecation, rectal bleeding or black/tarry stools, hemorrhoids, constipation, diarrhea, abdominal pain, jaundice, liver or gallbladder problems   Urinary: no frequency, urgency , hematuria, burning/pain with urination, recent flank pain, polyuria, nocturia, or difficulty urinating   Genital: no vaginal or pelvic infections   Peripheral Vascular: no claudication, leg cramps, prior DVTs, swelling of calves, legs, or feet, color change, or swelling with redness or tenderness   Musculoskeletal: no muscle or joint pain/stiffness, joint swelling, erythema of joints, or back pain   Psychiatric: no depression, mental disorders, or excessive stress Neurological: no history of CVA, dizziness, no sensory or motor loss, seizures, syncope, tremors, numbness, tingling, no changes in mood, attention, or speech, no changes in orientation, memory, insight, or judgment. no headache, vertigo. Hematologic: no anemia, easy bruising or bleeding   Endocrine: no diabetes, thyroid problems, heat or cold intolerance, excessive sweating, polyuria, polydipsia        Subjective:   Physical Exam    Visit Vitals  BP (!) 163/93   Pulse 78   Temp 97.7 °F (36.5 °C)   Resp 18   Ht 6' (1.829 m)   Wt 106.6 kg (235 lb)   SpO2 97%   BMI 31.87 kg/m²     General Appearance:  Well developed, well nourished,alert and oriented x 3, and individual in no acute distress. Ears/Nose/Mouth/Throat:   Hearing grossly normal.         Neck: Supple. Chest:   Lungs clear to auscultation bilaterally. Cardiovascular:  Regular rate and rhythm, S1, S2 normal, no murmur. Abdomen:   Soft, non-tender, bowel sounds are active. Extremities: No edema bilaterally. Skin: Warm and dry.                  Labs:   Recent Results (from the past 24 hour(s))   EKG, 12 LEAD, INITIAL    Collection Time: 12/24/20  1:46 PM   Result Value Ref Range    Ventricular Rate 81 BPM    Atrial Rate 81 BPM    P-R Interval 214 ms    QRS Duration 90 ms    Q-T Interval 368 ms    QTC Calculation (Bezet) 427 ms    Calculated P Axis 43 degrees    Calculated R Axis 1 degrees    Calculated T Axis 26 degrees    Diagnosis       !!! Poor data quality, interpretation may be adversely affected  Sinus rhythm with 1st degree A-V block  Cannot rule out Inferior infarct , age undetermined  Cannot rule out Anterior infarct , age undetermined  Abnormal ECG  When compared with ECG of 04-APR-2019 10:44,  Minimal criteria for Inferior infarct are now Present  ST no longer elevated in Lateral leads  Nonspecific T wave abnormality now evident in Inferior leads     CBC WITH AUTOMATED DIFF    Collection Time: 12/24/20  1:54 PM   Result Value Ref Range    WBC 7.2 4.3 - 11.1 K/uL    RBC 5.15 4.23 - 5.6 M/uL    HGB 15.9 13.6 - 17.2 g/dL    HCT 46.1 41.1 - 50.3 %    MCV 89.5 79.6 - 97.8 FL    MCH 30.9 26.1 - 32.9 PG    MCHC 34.5 31.4 - 35.0 g/dL    RDW 11.9 11.9 - 14.6 %    PLATELET 964 494 - 078 K/uL    MPV 8.9 (L) 9.4 - 12.3 FL    ABSOLUTE NRBC 0.00 0.0 - 0.2 K/uL    DF AUTOMATED      NEUTROPHILS 62 43 - 78 %    LYMPHOCYTES 25 13 - 44 %    MONOCYTES 8 4.0 - 12.0 %    EOSINOPHILS 4 0.5 - 7.8 %    BASOPHILS 1 0.0 - 2.0 %    IMMATURE GRANULOCYTES 1 0.0 - 5.0 %    ABS. NEUTROPHILS 4.5 1.7 - 8.2 K/UL    ABS. LYMPHOCYTES 1.8 0.5 - 4.6 K/UL    ABS. MONOCYTES 0.6 0.1 - 1.3 K/UL    ABS. EOSINOPHILS 0.3 0.0 - 0.8 K/UL    ABS. BASOPHILS 0.1 0.0 - 0.2 K/UL    ABS. IMM. GRANS. 0.0 0.0 - 0.5 K/UL   METABOLIC PANEL, COMPREHENSIVE    Collection Time: 12/24/20  1:54 PM   Result Value Ref Range    Sodium 137 (L) 138 - 145 mmol/L    Potassium 3.7 3.5 - 5.1 mmol/L    Chloride 103 98 - 107 mmol/L    CO2 28 21 - 32 mmol/L    Anion gap 6 (L) 7 - 16 mmol/L    Glucose 127 (H) 65 - 100 mg/dL    BUN 10 6 - 23 MG/DL    Creatinine 1.02 0.8 - 1.5 MG/DL    GFR est AA >60 >60 ml/min/1.73m2    GFR est non-AA >60 >60 ml/min/1.73m2    Calcium 9.7 8.3 - 10.4 MG/DL    Bilirubin, total 0.7 0.2 - 1.1 MG/DL    ALT (SGPT) 55 12 - 65 U/L    AST (SGOT) 38 (H) 15 - 37 U/L    Alk.  phosphatase 107 50 - 136 U/L    Protein, total 7.9 6.3 - 8.2 g/dL    Albumin 4.2 3.5 - 5.0 g/dL    Globulin 3.7 (H) 2.3 - 3.5 g/dL    A-G Ratio 1.1 (L) 1.2 - 3.5     TROPONIN-HIGH SENSITIVITY    Collection Time: 12/24/20  1:54 PM   Result Value Ref Range    Troponin-High Sensitivity 12.2 0 - 14 pg/mL   MAGNESIUM    Collection Time: 12/24/20  1:54 PM   Result Value Ref Range    Magnesium 2.3 1.8 - 2.4 mg/dL        Pt has been seen and examined by Dr. Antonio Cancino and he agrees with the following assessment and plan:     Assessment/Plan:       Diagnosis    Chest discomfort- recurrent, mild non obstructive CAD by cath 4/2019 and normal recent lexiscan nuclear stress test, troponin 12.2, no ECG changes concerning for ischemia, due to recurrent pain needs [facility] to r/o ischemia- plan for today, IVF, NPO now, NTG paste, ASA, add BB, statin    Essential hypertension- uncontrolled, continue Norvasc, add NTG paste and BB, monitor    Anxiety- continue prn Xanax    Mixed hyperlipidemia- Lipitor, no zocor with norvasc    Family history of aortic aneurysm    Mild intermittent asthma without complication- inhalers prn       Abigail García PA-C

## 2020-12-24 NOTE — H&P
[facility] Card[facility] ER Consultation        Date of  Admission: 12/24/2020  1:54 PM     Primary Care Physician: Dr. Sebastian German  Primary Cardiologist: Dr. Tami North  Referring Physician: Dr. Yogi Soto Physician: Dr. Jason Avelar    CC: chest discomfort    HPI:  Masood Bingham is a 60 y.o. male with h/o HTN, dyslipidemia, mild asthma and anxiety who presents to [facility] ED with c/o chest pain with exertion. He reports 3 weeks ago he started having CP after walking on treadmill that occurs each time he walks for 3 minutes. He reports radiation to back with associated SOB but denies palpitations, N/V diaphoresis or syncope. He saw Dr. Tami North who ordered a exercise nuclear stress test. During the test he had CP and it was changed to [address] [place] [address]. He talked with Dr. Tami North and was told it was normal. He had a LHC last year 4/2019 due to elevated Calcium score. Cath showed normal EF 60%, LAD with mid intramyocardial bridge, RI 20% and RCA 20%- non obstructive CAD. He presented to ED today after having CP at rest this AM and recurrent CP while walking outside. In ED, BP elevated at 172/111, HS troponin 12.2. [facility] Cardiology asked to see Pt to evaluate chest discomfort. Past Medical History:   Diagnosis Date    Arthritis     Chronic cough 10/2/2014    Family history of aortic aneurysm 10/2/2014    Glaucoma 2018    Hyperlipidemia     Mild intermittent asthma without complication 7/02/3407    Paroxysmal tachycardia (HCC)     Post-nasal drip 10/2/2014    Psychiatric disorder     PTSD    Varicose veins of other specified sites       Past Surgical History:   Procedure Laterality Date    HX TONSILLECTOMY         Allergies   Allergen Reactions    Gadobutrol Hives and Itching     Pt.  Broke out in hives,itching all over his body and throat feeling itchy and tight    Contrast Agent [Iodine] Rash      Social History     Socioeconomic History    Marital status: LEGALLY      Spouse name: Not on file  Number of children: Not on file    Years of education: Not on file    Highest education level: Not on file   Occupational History    Not on file   Social Needs    Financial resource strain: Not on file    Food insecurity     Worry: Not on file     Inability: Not on file    Transportation needs     Medical: Not on file     Non-medical: Not on file   Tobacco Use    Smoking status: Never Smoker    Smokeless tobacco: Never Used   Substance and Sexual Activity    Alcohol use: Yes     Alcohol/week: 3.0 - 5.0 standard drinks     Types: 3 - 5 Shots of liquor per week    Drug use: No     Frequency: 14.0 times per week    Sexual activity: Yes     Partners: Female   Lifestyle    Physical activity     Days per week: Not on file     Minutes per session: Not on file    Stress: Not on file   Relationships    Social connections     Talks on phone: Not on file     Gets together: Not on file     Attends [employer] service: Not on file     Active member of club or organization: Not on file     Attends meetings of clubs or organizations: Not on file     Relationship status: Not on file    Intimate partner violence     Fear of current or ex partner: Not on file     Emotionally abused: Not on file     Physically abused: Not on file     Forced sexual activity: Not on file   Other Topics Concern    Not on file   Social History Narrative    Retired . He lives with his girlfriend and has a dog. He worked a  years ago and may have had asbestos exposure. Family History   Problem Relation Age of Onset    Bleeding Prob Father         No current facility-administered medications for this encounter. Current Outpatient Medications   Medication Sig    isosorbide mononitrate ER (IMDUR) 30 mg tablet Take 1 Tab by mouth every morning.  ipratropium (ATROVENT) 42 mcg (0.06 %) nasal spray 2 Sprays by Both Nostrils route four (4) times daily.     amLODIPine (NORVASC) 5 mg tablet Take 1 Tab by mouth daily.    ezetimibe-simvastatin (Vytorin 10/40) 10-40 mg per tablet Take 1 Tab by mouth daily.  testosterone (ANDROGEL) 1 % (50 mg/5 gram) glpk 1 Packet by TransDERmal route daily. Max Daily Amount: 50 mg.    ALPRAZolam (Xanax) 1 mg tablet Take 1 Tab by mouth daily as needed for Anxiety.  aspirin (ASPIRIN) 325 mg tablet Take 325 mg by mouth daily.  fluticasone propionate (FLONASE) 50 mcg/actuation nasal spray 2 Sprays by Both Nostrils route daily.  vitamin k 100 mcg tablet Take 300 mcg by mouth daily.  cholecalciferol, vitamin d3, (VITAMIN D3) 400 unit cap Take  by mouth.        Review of symptoms:  General: no recent weight loss/gain, weakness, fatigue, fever or chills   Skin: no rashes, lumps, or other skin changes   HEENT: no headache, dizziness, lightheadedness, vision changes, hearing changes, tinnitus, vertigo, sinus pressure/pain, bleeding gums, sore throat, or hoarseness   Neck: no swollen glands, goiter, pain or stiffness   Respiratory: no cough, sputum, hemoptysis, dyspnea, wheezing   Cardiovascular: +chest pain or discomfort, palpitations, +dyspnea, orthopnea, paroxysmal nocturnal dyspnea, peripheral edema   Gastrointestinal: no trouble swallowing, heartburn, change of appetite, nausea, change in bowel habits, pain with defecation, rectal bleeding or black/tarry stools, hemorrhoids, constipation, diarrhea, abdominal pain, jaundice, liver or gallbladder problems   Urinary: no frequency, urgency , hematuria, burning/pain with urination, recent flank pain, polyuria, nocturia, or difficulty urinating   Genital: no vaginal or pelvic infections   Peripheral Vascular: no claudication, leg cramps, prior DVTs, swelling of calves, legs, or feet, color change, or swelling with redness or tenderness   Musculoskeletal: no muscle or joint pain/stiffness, joint swelling, erythema of joints, or back pain   Psychiatric: no depression, mental disorders, or excessive stress   Neurological: no history of CVA, dizziness, no sensory or motor loss, seizures, syncope, tremors, numbness, tingling, no changes in mood, attention, or speech, no changes in orientation, memory, insight, or judgment. no headache, vertigo. Hematologic: no anemia, easy bruising or bleeding   Endocrine: no diabetes, thyroid problems, heat or cold intolerance, excessive sweating, polyuria, polydipsia        Subjective:   Physical Exam    Visit Vitals  BP (!) 163/93   Pulse 78   Temp 97.7 °F (36.5 °C)   Resp 18   Ht 6' (1.829 m)   Wt 106.6 kg (235 lb)   SpO2 97%   BMI 31.87 kg/m²     General Appearance:  Well developed, well nourished,alert and oriented x 3, and individual in no acute distress. Ears/Nose/Mouth/Throat:   Hearing grossly normal.         Neck: Supple. Chest:   Lungs clear to auscultation bilaterally. Cardiovascular:  Regular rate and rhythm, S1, S2 normal, no murmur. Abdomen:   Soft, non-tender, bowel sounds are active. Extremities: No edema bilaterally. Skin: Warm and dry.            Labs:   Recent Results (from the past 24 hour(s))   EKG, 12 LEAD, INITIAL    Collection Time: 12/24/20  1:46 PM   Result Value Ref Range    Ventricular Rate 81 BPM    Atrial Rate 81 BPM    P-R Interval 214 ms    QRS Duration 90 ms    Q-T Interval 368 ms    QTC Calculation (Bezet) 427 ms    Calculated P Axis 43 degrees    Calculated R Axis 1 degrees    Calculated T Axis 26 degrees    Diagnosis       !!! Poor data quality, interpretation may be adversely affected  Sinus rhythm with 1st degree A-V block  Cannot rule out Inferior infarct , age undetermined  Cannot rule out Anterior infarct , age undetermined  Abnormal ECG  When compared with ECG of 04-APR-2019 10:44,  Minimal criteria for Inferior infarct are now Present  ST no longer elevated in Lateral leads  Nonspecific T wave abnormality now evident in Inferior leads     CBC WITH AUTOMATED DIFF    Collection Time: 12/24/20  1:54 PM   Result Value Ref Range    WBC 7.2 4.3 - 11.1 K/uL    RBC 5.15 4.23 - 5.6 M/uL    HGB 15.9 13.6 - 17.2 g/dL    HCT 46.1 41.1 - 50.3 %    MCV 89.5 79.6 - 97.8 FL    MCH 30.9 26.1 - 32.9 PG    MCHC 34.5 31.4 - 35.0 g/dL    RDW 11.9 11.9 - 14.6 %    PLATELET 231 965 - 398 K/uL    MPV 8.9 (L) 9.4 - 12.3 FL    ABSOLUTE NRBC 0.00 0.0 - 0.2 K/uL    DF AUTOMATED      NEUTROPHILS 62 43 - 78 %    LYMPHOCYTES 25 13 - 44 %    MONOCYTES 8 4.0 - 12.0 %    EOSINOPHILS 4 0.5 - 7.8 %    BASOPHILS 1 0.0 - 2.0 %    IMMATURE GRANULOCYTES 1 0.0 - 5.0 %    ABS. NEUTROPHILS 4.5 1.7 - 8.2 K/UL    ABS. LYMPHOCYTES 1.8 0.5 - 4.6 K/UL    ABS. MONOCYTES 0.6 0.1 - 1.3 K/UL    ABS. EOSINOPHILS 0.3 0.0 - 0.8 K/UL    ABS. BASOPHILS 0.1 0.0 - 0.2 K/UL    ABS. IMM. GRANS. 0.0 0.0 - 0.5 K/UL   METABOLIC PANEL, COMPREHENSIVE    Collection Time: 12/24/20  1:54 PM   Result Value Ref Range    Sodium 137 (L) 138 - 145 mmol/L    Potassium 3.7 3.5 - 5.1 mmol/L    Chloride 103 98 - 107 mmol/L    CO2 28 21 - 32 mmol/L    Anion gap 6 (L) 7 - 16 mmol/L    Glucose 127 (H) 65 - 100 mg/dL    BUN 10 6 - 23 MG/DL    Creatinine 1.02 0.8 - 1.5 MG/DL    GFR est AA >60 >60 ml/min/1.73m2    GFR est non-AA >60 >60 ml/min/1.73m2    Calcium 9.7 8.3 - 10.4 MG/DL    Bilirubin, total 0.7 0.2 - 1.1 MG/DL    ALT (SGPT) 55 12 - 65 U/L    AST (SGOT) 38 (H) 15 - 37 U/L    Alk.  phosphatase 107 50 - 136 U/L    Protein, total 7.9 6.3 - 8.2 g/dL    Albumin 4.2 3.5 - 5.0 g/dL    Globulin 3.7 (H) 2.3 - 3.5 g/dL    A-G Ratio 1.1 (L) 1.2 - 3.5     TROPONIN-HIGH SENSITIVITY    Collection Time: 12/24/20  1:54 PM   Result Value Ref Range    Troponin-High Sensitivity 12.2 0 - 14 pg/mL   MAGNESIUM    Collection Time: 12/24/20  1:54 PM   Result Value Ref Range    Magnesium 2.3 1.8 - 2.4 mg/dL        Pt has been seen and examined by Dr. Sonya Lawson and he agrees with the following assessment and plan:     Assessment/Plan:       Diagnosis    Chest discomfort- recurrent, mild non obstructive CAD by cath 4/2019 and normal recent lexiscan nuclear stress test, troponin 12.2, no ECG changes concerning for ischemia, due to recurrent pain needs LHC to r/o ischemia, change  mg to 81 mg daily, add BB, statin    Essential hypertension- uncontrolled, titrate meds    Anxiety- continue prn Xanax    Mixed hyperlipidemia- continue Vytorin    Family history of aortic aneurysm    Mild intermittent asthma without complication     Abigail García PA-C

## 2020-12-24 NOTE — ED NOTES
TRANSFER - OUT REPORT:    Verbal report given to Ender RN (name) on Mamie Figures  being transferred to 310(unit) for routine progression of care       Report consisted of patients Situation, Background, Assessment and   Recommendations(SBAR). Information from the following report(s) SBAR, ED Summary, [facility] and Recent Results was reviewed with the receiving nurse. Lines:   Peripheral IV 12/24/20 Left Antecubital (Active)   Site Assessment Clean, dry, & intact 12/24/20 1439   Phlebitis Assessment 0 12/24/20 1439   Infiltration Assessment 0 12/24/20 1439   Dressing Status Clean, dry, & intact 12/24/20 1439   Dressing Type 4 X 4 12/24/20 1439   Hub Color/Line Status Pink 12/24/20 1439        Opportunity for questions and clarification was provided.       Patient transported with:   Registered Nurse

## 2020-12-24 NOTE — PROGRESS NOTES
Telemetry monitor applied, bed low and locked, side rails x2. Patient has been oriented to room and instructed to use call light for assistance. Dual skin assessment completed with secondary RN which reveals the following: No edema. Sacrum and heels intact with no redness.

## 2020-12-24 NOTE — ED PROVIDER NOTES
With recurring chest discomfort. He is had this off and on now for multiple weeks. He has had work-up including a nuclear stress test that was low risk for patient. States that over the last several days he has had persistent recurring chest pain with physical activity. Today's event occurred when he was walking his dog and occurred approximately 2 minutes after initiating this. Never had a heart cath. Some shortness of breath with this but no diaphoresis or nausea or classic radiation. No pleuritic pain. The history is provided by the patient. Chest Pain (Angina)   This is a new problem. Pertinent negatives include no fever. Past Medical History:   Diagnosis Date    Arthritis     Chronic cough 10/2/2014    Family history of aortic aneurysm 10/2/2014    Glaucoma 2018    Hyperlipidemia     Mild intermittent asthma without complication 1/86/0502    Paroxysmal tachycardia (HCC)     Post-nasal drip 10/2/2014    Psychiatric disorder     PTSD    Varicose veins of other specified sites        Past Surgical History:   Procedure Laterality Date    HX TONSILLECTOMY           Family History:   Problem Relation Age of Onset    Bleeding Prob Father        Social History     Socioeconomic History    Marital status: LEGALLY      Spouse name: Not on file    Number of children: Not on file    Years of education: Not on file    Highest education level: Not on file   Occupational History    Not on file   Social Needs    Financial resource strain: Not on file    Food insecurity     Worry: Not on file     Inability: Not on file    Transportation needs     Medical: Not on file     Non-medical: Not on file   Tobacco Use    Smoking status: Never Smoker    Smokeless tobacco: Never Used   Substance and Sexual Activity    Alcohol use:  Yes     Alcohol/week: 3.0 - 5.0 standard drinks     Types: 3 - 5 Shots of liquor per week    Drug use: No     Frequency: 14.0 times per week    Sexual activity: Yes     Partners: Female   Lifestyle    Physical activity     Days per week: Not on file     Minutes per session: Not on file    Stress: Not on file   Relationships    Social connections     Talks on phone: Not on file     Gets together: Not on file     Attends [employer] service: Not on file     Active member of club or organization: Not on file     Attends meetings of clubs or organizations: Not on file     Relationship status: Not on file    Intimate partner violence     Fear of current or ex partner: Not on file     Emotionally abused: Not on file     Physically abused: Not on file     Forced sexual activity: Not on file   Other Topics Concern    Not on file   Social History Narrative    Retired . He lives with his girlfriend and has a dog. He worked a  years ago and may have had asbestos exposure. ALLERGIES: Gadobutrol and Contrast agent [iodine]    Review of Systems   Constitutional: Positive for fatigue. Negative for chills and fever. HENT: Negative. Cardiovascular: Positive for chest pain. All other systems reviewed and are negative. Vitals:    12/24/20 1608 12/24/20 1638 12/24/20 1702 12/24/20 1743   BP: (!) 161/101 (!) 180/101 (!) 194/124 131/89   Pulse: 75 73 76 93   Resp:    18   Temp:    97.8 °F (36.6 °C)   SpO2: 98% 99% 96% 96%   Weight:       Height:                Physical Exam  Vitals signs and nursing note reviewed. Constitutional:       General: He is not in acute distress. Appearance: He is well-developed. He is not ill-appearing or diaphoretic. HENT:      Head: Atraumatic. Eyes:      General: No scleral icterus. Neck:      Musculoskeletal: Neck supple. Vascular: No JVD. Cardiovascular:      Rate and Rhythm: Normal rate and regular rhythm. No extrasystoles are present. Pulmonary:      Effort: Pulmonary effort is normal. No respiratory distress. Breath sounds: Normal breath sounds.  No decreased breath sounds, wheezing or rhonchi. Chest:      Chest wall: No mass or tenderness. Musculoskeletal:      Right lower leg: He exhibits no tenderness. No edema. Left lower leg: He exhibits no tenderness. No edema. Skin:     General: Skin is warm and dry. Neurological:      General: No focal deficit present. Mental Status: He is alert. Psychiatric:         Mood and Affect: Mood normal.         Behavior: Behavior normal.         Thought Content: Thought content normal.          MDM  Number of Diagnoses or Management Options  Diagnosis management comments: Recurring chest pain with negative prior stress test.  Patient's pain is each time initiated with exertion. Regard to this cardiology is consulted and they are going to do a heart cath on patient today to further evaluate.        Amount and/or Complexity of Data Reviewed  Clinical lab tests: ordered  Tests in the radiology section of CPT®: reviewed and ordered  Independent visualization of images, tracings, or specimens: yes    Risk of Complications, Morbidity, and/or Mortality  Presenting problems: moderate  Diagnostic procedures: low  Management options: moderate           Procedures

## 2020-12-25 VITALS
SYSTOLIC BLOOD PRESSURE: 141 MMHG | HEART RATE: 86 BPM | DIASTOLIC BLOOD PRESSURE: 91 MMHG | OXYGEN SATURATION: 95 % | HEIGHT: 72 IN | WEIGHT: 234.9 LBS | BODY MASS INDEX: 31.82 KG/M2 | RESPIRATION RATE: 20 BRPM | TEMPERATURE: 97.8 F

## 2020-12-25 LAB
ANION GAP SERPL CALC-SCNC: 7 MMOL/L (ref 7–16)
BUN SERPL-MCNC: 9 MG/DL (ref 6–23)
CALCIUM SERPL-MCNC: 9.2 MG/DL (ref 8.3–10.4)
CHLORIDE SERPL-SCNC: 104 MMOL/L (ref 98–107)
CHOLEST SERPL-MCNC: 218 MG/DL
CO2 SERPL-SCNC: 26 MMOL/L (ref 21–32)
CREAT SERPL-MCNC: 0.88 MG/DL (ref 0.8–1.5)
ERYTHROCYTE [DISTWIDTH] IN BLOOD BY AUTOMATED COUNT: 12 % (ref 11.9–14.6)
GLUCOSE SERPL-MCNC: 128 MG/DL (ref 65–100)
HCT VFR BLD AUTO: 42.2 % (ref 41.1–50.3)
HDLC SERPL-MCNC: 37 MG/DL (ref 40–60)
HDLC SERPL: 5.9 {RATIO}
HGB BLD-MCNC: 14.9 G/DL (ref 13.6–17.2)
LDLC SERPL CALC-MCNC: 112.8 MG/DL
LIPID PROFILE,FLP: ABNORMAL
MCH RBC QN AUTO: 31 PG (ref 26.1–32.9)
MCHC RBC AUTO-ENTMCNC: 35.3 G/DL (ref 31.4–35)
MCV RBC AUTO: 87.9 FL (ref 79.6–97.8)
NRBC # BLD: 0 K/UL (ref 0–0.2)
PLATELET # BLD AUTO: 235 K/UL (ref 150–450)
PMV BLD AUTO: 9 FL (ref 9.4–12.3)
POTASSIUM SERPL-SCNC: 3.9 MMOL/L (ref 3.5–5.1)
RBC # BLD AUTO: 4.8 M/UL (ref 4.23–5.6)
SODIUM SERPL-SCNC: 137 MMOL/L (ref 138–145)
TRIGL SERPL-MCNC: 341 MG/DL (ref 35–150)
VLDLC SERPL CALC-MCNC: 68.2 MG/DL (ref 6–23)
WBC # BLD AUTO: 6.1 K/UL (ref 4.3–11.1)

## 2020-12-25 PROCEDURE — 99218 HC RM OBSERVATION: CPT

## 2020-12-25 PROCEDURE — 80048 BASIC METABOLIC PNL TOTAL CA: CPT

## 2020-12-25 PROCEDURE — 74011250637 HC RX REV CODE- 250/637: Performed by: PHYSICIAN ASSISTANT

## 2020-12-25 PROCEDURE — 36415 COLL VENOUS BLD VENIPUNCTURE: CPT

## 2020-12-25 PROCEDURE — 99217 PR OBSERVATION CARE DISCHARGE MANAGEMENT: CPT | Performed by: INTERNAL MEDICINE

## 2020-12-25 PROCEDURE — 85027 COMPLETE CBC AUTOMATED: CPT

## 2020-12-25 PROCEDURE — 80061 LIPID PANEL: CPT

## 2020-12-25 PROCEDURE — 74011250637 HC RX REV CODE- 250/637: Performed by: INTERNAL MEDICINE

## 2020-12-25 RX ORDER — METOPROLOL SUCCINATE 25 MG/1
25 TABLET, EXTENDED RELEASE ORAL DAILY
Qty: 30 TAB | Refills: 5 | Status: SHIPPED | OUTPATIENT
Start: 2020-12-25 | End: 2021-04-09 | Stop reason: SDUPTHER

## 2020-12-25 RX ORDER — ROSUVASTATIN CALCIUM 40 MG/1
40 TABLET, COATED ORAL
Qty: 90 TAB | Refills: 3 | Status: SHIPPED | OUTPATIENT
Start: 2020-12-25 | End: 2021-08-10 | Stop reason: SDUPTHER

## 2020-12-25 RX ADMIN — METOPROLOL TARTRATE 25 MG: 25 TABLET, FILM COATED ORAL at 08:06

## 2020-12-25 RX ADMIN — PRASUGREL HYDROCHLORIDE 10 MG: 10 TABLET, FILM COATED ORAL at 08:07

## 2020-12-25 RX ADMIN — AMLODIPINE BESYLATE 5 MG: 5 TABLET ORAL at 08:06

## 2020-12-25 RX ADMIN — Medication 10 ML: at 05:08

## 2020-12-25 RX ADMIN — ASPIRIN 81 MG CHEWABLE TABLET 81 MG: 81 TABLET CHEWABLE at 08:06

## 2020-12-25 RX ADMIN — PANTOPRAZOLE SODIUM 40 MG: 40 TABLET, DELAYED RELEASE ORAL at 06:30

## 2020-12-25 NOTE — DISCHARGE SUMMARY
[facility] Cardiology Discharge Summary     Patient ID:  Beatris Singh  [id number]  89 y.o.  1965    Admit date: 12/24/2020    Discharge date:  12/25/2020    Admitting Physician: Yasir Peña MD     Discharge Physician: Dr. Kade Everett    Admission Diagnoses: Chest pain [R07.9]    Discharge Diagnoses:    Diagnosis    Coronary artery disease involving native coronary artery of native heart with unstable angina pectoris [facility])    Chest discomfort    Hyperlipidemia       Cardiology Procedures this admission:  Left heart catheterization with PCI  Consults: None    Hospital Course: Patient presented to the ER with complaints of chest pain. Serial cardiac enzymes were done and remained negative. Patient was also noted to be significantly hypertensive on arrival with BP of 172/111. Patient was admitted to telemetry for continued care. Patient underwent cardiac catheterization by Dr. Kade Everett. Patient was found to have obstructive CAD of the proximal RPL that was stented with a 2.5 x 12-mm Resolute Kenedy RAVINDRA with 0% residual stenosis. Patient tolerated the procedure well and was taken to the telemetry floor for recovery. The morning of discharge, patient was up feeling well without any complaints of chest pain or shortness of breath. Patient's right radial cath site was clean, dry and intact without hematoma or bruit. Patient's labs were WNL. Patient was seen and examined by Dr. Kade Everett and determined stable and ready for discharge. Patient was instructed on the importance of medication compliance including taking aspirin and Effient everyday without missing a dose. After receiving drug eluting stents, the patient will remain on dual anti-platelet therapy for 1 year. For maximized medical therapy for CAD, patient will continue BB and statin as well. The patient will follow up with [facility]iology In 2-4 weeks. Patient has been referred to cardiac rehab.       DISPOSITION: The patient is being discharged home in stable condition on a low saturated fat, low cholesterol and low salt diet. The patient is instructed to advance activities as tolerated to the limit of fatigue or shortness of breath. The patient is instructed to avoid all heavy lifting for 3-5 days. The patient is instructed to watch the cath site for bleeding/oozing; if seen, the patient is instructed to apply firm pressure with a clean cloth and call [facility] at [phone]. The patient is instructed to watch for signs of infection which include: increasing area of redness, fever/hot to touch or purulent drainage at the catheterization site. The patient is instructed not to soak in a bathtub for 7-10 days, but is cleared to shower. The patient is instructed to call the office or return to the ER for immediate evaluation for any shortness of breath or chest pain not relieved by NTG. Discharge Exam: Patient has been seen by Dr. Shankar Lloyd: see his progress note for exam details.     Visit Vitals  BP (!) 144/66 (BP 1 Location: Left arm, BP Patient Position: At rest)   Pulse 67   Temp 98.1 °F (36.7 °C)   Resp 16   Ht 6' (1.829 m)   Wt 234 lb 14.4 oz (106.5 kg)   SpO2 96%   BMI 31.86 kg/m²       Recent Results (from the past 24 hour(s))   EKG, 12 LEAD, INITIAL    Collection Time: 12/24/20  1:46 PM   Result Value Ref Range    Ventricular Rate 81 BPM    Atrial Rate 81 BPM    P-R Interval 214 ms    QRS Duration 90 ms    Q-T Interval 368 ms    QTC Calculation (Bezet) 427 ms    Calculated P Axis 43 degrees    Calculated R Axis 1 degrees    Calculated T Axis 26 degrees    Diagnosis       !!! Poor data quality, interpretation may be adversely affected  Sinus rhythm with 1st degree A-V block  Nonspecific T wave abnormality  Abnormal ECG  When compared with ECG of 04-APR-2019 10:44,  Nonspecific T wave abnormality now evident in Inferior leads  Confirmed by ST CECIL ARMSTRONG MD (), JUAN MIGUEL BAKER ([id number]) on 12/24/2020 8:25:27 PM     CBC WITH AUTOMATED DIFF    Collection Time: 12/24/20  1:54 PM   Result Value Ref Range    WBC 7.2 4.3 - 11.1 K/uL    RBC 5.15 4.23 - 5.6 M/uL    HGB 15.9 13.6 - 17.2 g/dL    HCT 46.1 41.1 - 50.3 %    MCV 89.5 79.6 - 97.8 FL    MCH 30.9 26.1 - 32.9 PG    MCHC 34.5 31.4 - 35.0 g/dL    RDW 11.9 11.9 - 14.6 %    PLATELET 890 230 - 089 K/uL    MPV 8.9 (L) 9.4 - 12.3 FL    ABSOLUTE NRBC 0.00 0.0 - 0.2 K/uL    DF AUTOMATED      NEUTROPHILS 62 43 - 78 %    LYMPHOCYTES 25 13 - 44 %    MONOCYTES 8 4.0 - 12.0 %    EOSINOPHILS 4 0.5 - 7.8 %    BASOPHILS 1 0.0 - 2.0 %    IMMATURE GRANULOCYTES 1 0.0 - 5.0 %    ABS. NEUTROPHILS 4.5 1.7 - 8.2 K/UL    ABS. LYMPHOCYTES 1.8 0.5 - 4.6 K/UL    ABS. MONOCYTES 0.6 0.1 - 1.3 K/UL    ABS. EOSINOPHILS 0.3 0.0 - 0.8 K/UL    ABS. BASOPHILS 0.1 0.0 - 0.2 K/UL    ABS. IMM. GRANS. 0.0 0.0 - 0.5 K/UL   METABOLIC PANEL, COMPREHENSIVE    Collection Time: 12/24/20  1:54 PM   Result Value Ref Range    Sodium 137 (L) 138 - 145 mmol/L    Potassium 3.7 3.5 - 5.1 mmol/L    Chloride 103 98 - 107 mmol/L    CO2 28 21 - 32 mmol/L    Anion gap 6 (L) 7 - 16 mmol/L    Glucose 127 (H) 65 - 100 mg/dL    BUN 10 6 - 23 MG/DL    Creatinine 1.02 0.8 - 1.5 MG/DL    GFR est AA >60 >60 ml/min/1.73m2    GFR est non-AA >60 >60 ml/min/1.73m2    Calcium 9.7 8.3 - 10.4 MG/DL    Bilirubin, total 0.7 0.2 - 1.1 MG/DL    ALT (SGPT) 55 12 - 65 U/L    AST (SGOT) 38 (H) 15 - 37 U/L    Alk.  phosphatase 107 50 - 136 U/L    Protein, total 7.9 6.3 - 8.2 g/dL    Albumin 4.2 3.5 - 5.0 g/dL    Globulin 3.7 (H) 2.3 - 3.5 g/dL    A-G Ratio 1.1 (L) 1.2 - 3.5     TROPONIN-HIGH SENSITIVITY    Collection Time: 12/24/20  1:54 PM   Result Value Ref Range    Troponin-High Sensitivity 12.2 0 - 14 pg/mL   MAGNESIUM    Collection Time: 12/24/20  1:54 PM   Result Value Ref Range    Magnesium 2.3 1.8 - 2.4 mg/dL   TROPONIN-HIGH SENSITIVITY    Collection Time: 12/24/20  4:17 PM   Result Value Ref Range    Troponin-High Sensitivity 13.7 0 - 14 pg/mL   METABOLIC PANEL, BASIC    Collection Time: 12/25/20  3:55 AM   Result Value Ref Range    Sodium 137 (L) 138 - 145 mmol/L    Potassium 3.9 3.5 - 5.1 mmol/L    Chloride 104 98 - 107 mmol/L    CO2 26 21 - 32 mmol/L    Anion gap 7 7 - 16 mmol/L    Glucose 128 (H) 65 - 100 mg/dL    BUN 9 6 - 23 MG/DL    Creatinine 0.88 0.8 - 1.5 MG/DL    GFR est AA >60 >60 ml/min/1.73m2    GFR est non-AA >60 >60 ml/min/1.73m2    Calcium 9.2 8.3 - 10.4 MG/DL   LIPID PANEL    Collection Time: 12/25/20  3:55 AM   Result Value Ref Range    LIPID PROFILE          Cholesterol, total 218 (H) <200 MG/DL    Triglyceride 341 (H) 35 - 150 MG/DL    HDL Cholesterol 37 (L) 40 - 60 MG/DL    LDL, calculated 112.8 (H) <100 MG/DL    VLDL, calculated 68.2 (H) 6.0 - 23.0 MG/DL    CHOL/HDL Ratio 5.9     CBC W/O DIFF    Collection Time: 12/25/20  3:55 AM   Result Value Ref Range    WBC 6.1 4.3 - 11.1 K/uL    RBC 4.80 4.23 - 5.6 M/uL    HGB 14.9 13.6 - 17.2 g/dL    HCT 42.2 41.1 - 50.3 %    MCV 87.9 79.6 - 97.8 FL    MCH 31.0 26.1 - 32.9 PG    MCHC 35.3 (H) 31.4 - 35.0 g/dL    RDW 12.0 11.9 - 14.6 %    PLATELET 853 063 - 413 K/uL    MPV 9.0 (L) 9.4 - 12.3 FL    ABSOLUTE NRBC 0.00 0.0 - 0.2 K/uL         Patient Instructions:   Current Discharge Medication List      START taking these medications    Details   aspirin 81 mg chewable tablet Take 1 Tab by mouth daily. Qty:        metoprolol tartrate (LOPRESSOR) 25 mg tablet Take 1 Tab by mouth two (2) times a day. Qty: 60 Tab, Refills: 5      nitroglycerin (NITROSTAT) 0.4 mg SL tablet 1 Tab by SubLINGual route every five (5) minutes as needed for Chest Pain. Up to 3 doses. Qty: 1 Bottle, Refills: 4      pantoprazole (PROTONIX) 40 mg tablet Take 1 Tab by mouth Daily (before breakfast). Qty: 30 Tab, Refills: 5      prasugreL (EFFIENT) 10 mg tablet Take 1 Tab by mouth daily.   Qty: 30 Tab, Refills: 11         CONTINUE these medications which have NOT CHANGED    Details   isosorbide mononitrate ER (IMDUR) 30 mg tablet Take 1 Tab by mouth every morning. Qty: 30 Tab, Refills: 3      ipratropium (ATROVENT) 42 mcg (0.06 %) nasal spray 2 Sprays by Both Nostrils route four (4) times daily. Qty: 165 mL, Refills: 3    Associated Diagnoses: Moderate persistent asthma without complication      amLODIPine (NORVASC) 5 mg tablet Take 1 Tab by mouth daily. Qty: 90 Tab, Refills: 3    Associated Diagnoses: Essential hypertension      ezetimibe-simvastatin (Vytorin 10/40) 10-40 mg per tablet Take 1 Tab by mouth daily. Qty: 90 Tab, Refills: 3    Associated Diagnoses: Hyperlipidemia, unspecified hyperlipidemia type      testosterone (ANDROGEL) 1 % (50 mg/5 gram) glpk 1 Packet by TransDERmal route daily. Max Daily Amount: 50 mg.  Qty: 90 Packet, Refills: 1    Associated Diagnoses: Hypogonadism in male      ALPRAZolam (Xanax) 1 mg tablet Take 1 Tab by mouth daily as needed for Anxiety. Qty: 30 Tab, Refills: 0    Associated Diagnoses: Anxiety      fluticasone propionate (FLONASE) 50 mcg/actuation nasal spray 2 Sprays by Both Nostrils route daily. Qty: 1 Bottle, Refills: 11      vitamin k 100 mcg tablet Take 300 mcg by mouth daily. cholecalciferol, vitamin d3, (VITAMIN D3) 400 unit cap Take  by mouth. STOP taking these medications       aspirin (ASPIRIN) 325 mg tablet Comments:   Reason for Stopping:                 Signed:  Yasir Peña MD  12/25/2020  9:18 PM         [facility] CARDIOLOGY     12/25/2020     7:54 AM    I have personally seen and examined Beatris Singh with Lars Aparicio NP. I confirm findings and plan as outlined in discharge summary. Have reviewed and discussed discharge medication, diet and instructions with patient. Patient to follow up as directed and contact our office with any questions.        aYsir Peña MD

## 2020-12-25 NOTE — PROCEDURES
[facility]  CARDIAC CATH    Name:  Yuriy Butts  MR#:  [id number]  :  1965  ACCOUNT #:  [de-identified]  DATE OF SERVICE:  2020    PROCEDURES PERFORMED:  1. Left heart cath, selective coronary arteriography, left ventriculogram via the right radial approach. 2.  PCI and stenting of the right posterolateral branch of the right coronary artery with an Penhook drug-eluting stent. PREOPERATIVE DIAGNOSIS:  Unstable angina. POSTOPERATIVE DIAGNOSIS:  Obstructive coronary artery disease requiring PCI and stenting of the right posterolateral branch of the right coronary artery. SURGEON:  Radha Arenas MD    ASSISTANT:  None. ESTIMATED BLOOD LOSS:  Less than 5 mL. SPECIMENS REMOVED:  None. COMPLICATIONS:  None. IMPLANTS:  Resolute Penhook drug-eluting stent. ANESTHESIA:  The patient received moderate supervised conscious with 3 mg of Versed and 75 mcg of fentanyl. Sedation start time was 18:56 with a procedure completion time of 19:28. Sedation was administered by Dev Ventura RN under my supervision. FINDINGS:  As below. TECHNICAL FACTORS:  After informed consent was obtained, the patient was brought to the cardiac catheterization lab. The right radial artery was prepped and draped in the usual sterile fashion. Utilizing modified Seldinger technique and micropuncture needle, the right radial artery was entered. A 6-[place] Terumo slender sheath was placed without difficulty. A radial cocktail consisting of 2000 units of heparin, 2 mg of verapamil and 200 mcg of nitroglycerin was administered. A 5-[place] Tiger 4.0 catheter was used to select and engage the ostium of the left main coronary and right coronary artery respectively. Selective injection verification was  performed. A pigtail catheter was used to cross the aortic valve and the left ventricle. Hemodynamic measurements and left ventriculogram were obtained.   Left ventricular aortic pressure gradient was obtained by pullback technique. At the conclusion of diagnostic procedure, the patient was referred for PCI and stenting of the right posterolateral branch. Please see procedure note for the details. CONTRAST:  Isovue 280. HEMODYNAMIC RESULTS:  1. Aortic pressure 112/78. 2.  Left ventricular end diastolic pressure 14.  3.  No significant gradient across the aortic valve. ANGIOGRAPHIC RESULTS:  1. Left main coronary artery:  Large-caliber vessel. Angiographically normal.  2.  LAD:  It is a medium to large-caliber vessel. 20% proximal stenosis. 20-30% mid stenosis. Distal vessel is patent. 3.  First diagonal artery:  Medium-caliber vessel. 20% ostial stenosis. 4.  Second diagonal artery:  Small-caliber vessel. Patent. 5.  Left circumflex is a medium-caliber nondominant vessel. 20-30% ostial stenosis. 20% mid stenosis. 6.  First obtuse marginal artery:  Small-caliber vessel. 30% ostial stenosis. 7.  Ramus intermediate:  Medium-caliber vessel. Contains 20% ostial stenosis. 8.  Right coronary artery:  Large-caliber dominant vessel. Contains 30% distal stenosis. 9.  Right PDA:  Medium-caliber vessel. Patent. 10.  Right posterolateral branch:  Medium-caliber vessel. Contains 95% proximal stenosis which is the obvious culprit of his unstable symptoms. 11.  Left ventriculogram performed in BRIGHT projection shows normal left ventricular systolic function. EF 60-65%. No focal segmental wall motion abnormalities. Aortic root is nondilated. CONCLUSION:  1. Obstructive one-vessel coronary artery disease involving the right posterolateral branch with a high-grade subtotal occlusion. This is the obvious culprit of his unstable symptoms. 2.  Normal left ventricular systolic function. PLAN:  Proceed with PCI of the right posterolateral branch. PCI NOTE:  Lesion:  Right posterolateral branch. Pre-stenosis 95%, post stenosis 0%.     TECHNICAL FACTORS:  The patient given intravenous Angiomax. ACT was greater than 400. A JR-5 catheter was used to select and engage the ostium of the right coronary artery. A Prowater wire was positioned in the terminal right posterolateral branch. The lesion was predilated with a 2.25 x 12 mm Trek balloon to 12 atmospheres. Following predilatation, a Resolute Major 2.5 x 12 mm drug-eluting stent was positioned and deployed to 18 atmospheres. Following stent deployment a 2.75 x 8 mm NC Trek balloon was positioned and deployed to 18 atmospheres on two separate occasions. At the conclusion of the procedure, the radial sheath was removed and a pneumatic band was placed with good hemostasis. The patient was taken to recovery in stable condition.         Rc Boone MD      [id number]  D:  12/24/2020 19:41  T:  12/24/2020 20:28  JOB #:  [id number]  CC:  Mercedez Sargent MD       [facility] Cardiology

## 2020-12-25 NOTE — ROUTINE PROCESS
Shift change report received from Mila, [facility], [id number] [facility] (off going nurse). Plan of care reviewed with patient at bedside. Opportunity to ask questions provided. Denies needs at this time. Patient instructed to call for assistance. Patient verbalized understanding.

## 2020-12-25 NOTE — ROUTINE PROCESS
TRANSFER - IN REPORT: 
 
Verbal report received from DERIC John on Christine Montiel  being received from [facility] for routine progression of care. Report consisted of patients Situation, Background, Assessment and  
Recommendations(SBAR). Information from the following reports was reviewed: Kardex, Procedure Summary, MAR and Recent Results. Opportunity for questions and clarification was provided. Patient received to room 310 and connected to telemetry monitor. Assessment completed and plan of care reviewed. R radial site with TR band in place; 12mL of air per CCL. Site dry and intact without hematoma. BP cycling every 15 minutes on L arm. Patient oriented to room and call light. Patient voiced understanding to lay flat with affected leg straight. Patient voiced understanding to use call light to communicate needs. Admission skin assessment completed with second RN and reveals the following: 
TR band in place s/p LHC Sacrum & heels intact with no redness.

## 2020-12-25 NOTE — PROCEDURES
Brief Cardiac Procedure Note    Patient: Shabbir Shah MRN: [id number]  SSN: [id number]    YOB: 1965  Age: 60 y.o. Sex: male      Date of Procedure: 12/24/2020     Pre-procedure Diagnosis: Unstable angina    Post-procedure Diagnosis: Coronary artery disease    Procedure: Left Heart Catheterization with PCI    Brief Description of Procedure: As above    Performed By: Lea Garrido MD     Assistants: None    Anesthesia: Moderate Sedation    Estimated Blood Loss: Less than 10 mL      Specimens: None    Implants: None    Findings: Obstructive CAD. PCI of prox RPL with Major 2.5 x12. Post dilated to 2.75. Normal EF. Complications: None    Recommendations: Continue medical therapy.     Signed By: Lea Garrido MD     December 24, 2020

## 2020-12-25 NOTE — DISCHARGE INSTRUCTIONS
Cardiac Catheterization/Angiography Discharge Instructions    *Check the puncture site frequently for swelling or bleeding. If you see any bleeding, lie down and apply pressure over the area with a clean town or washcloth. Notify your doctor for any redness, swelling, drainage or oozing from the puncture site. Notify your doctor for any fever or chills. *If the leg or arm with the puncture becomes cold, numb or painful, call [address] Cardiology at [phone]    *Activity should be limited for the next 48 hours. Climb stairs as little as possible and avoid any stooping, bending or strenuous activity for 48 hours. No heavy lifting (anything over 10 pounds) for three days. *Do not drive for 48 hours. *You may resume your usual diet. Drink more fluids than usual.    *Have a responsible person drive you home and stay with you for at least 24 hours after your heart catheterization/angiography. Patient Education        Coronary Angiogram: What to Expect at Home  Your Recovery    A coronary angiogram is a test to examine the large blood vessel of your heart (coronary artery). The doctor inserted a thin, flexible tube (catheter) into a blood vessel in your groin. In some cases, the catheter is placed in a blood vessel in the arm. Your groin or arm may have a bruise and feel sore for a day or two after the procedure. You can do light activities around the house but nothing strenuous for several days. This care sheet gives you a general idea about how long it will take for you to recover. But each person recovers at a different pace. Follow the steps below to feel better as quickly as possible. How can you care for yourself at home? Activity    · If the doctor gave you a sedative:  ? For 24 hours, don't do anything that requires attention to detail, such as going to work, making important decisions, or signing any legal documents.  It takes time for the medicine's effects to completely wear off.  ? For your safety, do not drive or operate any machinery that could be dangerous. Wait until the medicine wears off and you can think clearly and react easily.     · Do not do strenuous exercise and do not lift, pull, or push anything heavy until your doctor says it is okay. This may be for a day or two. You can walk around the house and do light activity, such as cooking.     · If the catheter was placed in your groin, try not to walk up stairs for the first couple of days.     · If the catheter was placed in your arm near your wrist, do not bend your wrist deeply for the first couple of days. Be careful using your hand to get into and out of a chair or bed.     · If your doctor recommends it, get more exercise. Walking is a good choice. Bit by bit, increase the amount you walk every day. Try for at least 30 minutes on most days of the week. Diet    · Drink plenty of fluids to help your body flush out the dye. If you have kidney, heart, or liver disease and have to limit fluids, talk with your doctor before you increase the amount of fluids you drink.     · Keep eating a heart-healthy diet that has lots of fruits, vegetables, and whole grains. If you have not been eating this way, talk to your doctor. You also may want to talk to a dietitian. This expert can help you to learn about healthy foods and plan meals. Medicines    · Your doctor will tell you if and when you can restart your medicines. He or she will also give you instructions about taking any new medicines.     · If you take aspirin or some other blood thinner, ask your doctor if and when to start taking it again. Make sure that you understand exactly what your doctor wants you to do.     · Your doctor may prescribe a blood-thinning medicine like aspirin or clopidogrel (Plavix). It is very important that you take these medicines exactly as directed in order to keep the coronary artery open and reduce your risk of a heart attack.  Be safe with medicines. Call your doctor if you think you are having a problem with your medicine. Care of the catheter site    · For 1 or 2 days, keep a bandage over the spot where the catheter was inserted. The bandage probably will fall off in this time.     · Put ice or a cold pack on the area for 10 to 20 minutes at a time to help with soreness or swelling. Put a thin cloth between the ice and your skin.     · You may shower 24 to 48 hours after the procedure, if your doctor okays it. Pat the incision dry.     · Do not soak the catheter site until it is healed. Don't take a bath for 1 week, or until your doctor tells you it is okay.     · Watch for bleeding from the site. A small amount of blood (up to the size of a quarter) on the bandage can be normal.     · If you are bleeding, lie down and press on the area for 15 minutes to try to make it stop. If the bleeding does not stop, call your doctor or seek immediate medical care. Follow-up care is a key part of your treatment and safety. Be sure to make and go to all appointments, and call your doctor if you are having problems. It's also a good idea to know your test results and keep a list of the medicines you take. When should you call for help? Call 911 anytime you think you may need emergency care. For example, call if:    · You passed out (lost consciousness).     · You have severe trouble breathing.     · You have sudden chest pain and shortness of breath, or you cough up blood.     · You have symptoms of a heart attack. These may include:  ? Chest pain or pressure, or a strange feeling in the chest.  ? Sweating. ? Shortness of breath. ? Nausea or vomiting. ? Pain, pressure, or a strange feeling in the back, neck, jaw, or upper belly, or in one or both shoulders or arms. ? Lightheadedness or sudden weakness. ? A fast or irregular heartbeat. After you call 911, the  may tel you to chew 1 adult-strength or 2 to 4 low-dose aspirin.  Wait for an ambulance. Do not try to drive yourself.     · You have been diagnosed with angina, and you have symptoms that do not go away with rest or are not getting better within 5 minutes after you take a dose of nitroglycerin. Call your doctor now or seek immediate medical care if:    · You are bleeding from the area where the catheter was put in your artery.     · You have a fast-growing, painful lump at the catheter site.     · You have signs of infection, such as:  ? Increased pain, swelling, warmth, or redness. ? Red streaks leading from the catheter site. ? Pus draining from the catheter site. ? A fever.     · Your leg, arm, or hand is painful, looks blue, or feels cold, numb, or tingly. Watch closely for changes in your health, and be sure to contact your doctor if you have any problems. Where can you learn more? Go to http://www.gray.com/  Enter D192 in the search box to learn more about \"Coronary Angiogram: What to Expect at Home. \"  Current as of: December 16, 2019               Content Version: 12.6  © 0014-2791 [employer]. Care instructions adapted under license by [employer] (which disclaims liability or warranty for this information). If you have questions about a medical condition or this instruction, always ask your healthcare professional. [place] [address] any warranty or liability for your use of this information. *You may remove the bandage from your {ARM/GROIN:60756} in 24 hours. You may shower in 24 hours. No tub baths, hot tubs or swimming for one week. Do not place any lotions, creams, powders, ointments over the puncture site for one week. You may place a clean band-aid over the puncture site each day for 5 days. Change this daily. Patient Education        Learning About Coronary Angioplasty  What is a coronary angioplasty? Coronary angioplasty is the name for procedures to open a blocked coronary artery. This also may be called percutaneous coronary intervention (PCI). An angioplasty is a way to open a blocked coronary artery before, during, or after a heart attack. It gets blood flowing to your heart. And it can help prevent heart problems by widening an artery that has been narrowed by fatty buildup (plaque). How is the procedure done? Coronary angioplasty is done in a cardiac catheterization laboratory (\"cath lab\"). It is done by a heart specialist called a cardiologist. The whole procedure may take 1½ to 3 hours. You lie on a table under a large X-ray machine. You will get medicine through an IV in one of your veins. It helps you relax and not feel pain. You will be awake during the procedure. But you may not be able to remember much about it. The doctor will inject some medicine into your arm or groin to numb the skin. You will feel a small needle poke you. It's like having a blood test. You may feel some pressure when the doctor puts in the catheter. But you will not feel pain. The doctor will look at X-ray pictures on a monitor (like a TV screen) to move the catheter to your heart. The doctor then puts a dye into the catheter. This makes your heart's arteries show up on a screen. The doctor can then see any arteries that are blocked or narrowed. You may feel warm or flushed for a short time when the doctor injects dye into your artery. If you have a blocked or narrow artery, the doctor uses a catheter with a tiny balloon at the tip. The doctor puts it into the blocked or narrow area and inflates it. The balloon presses the fatty buildup against the walls of the artery. This buildup is called plaque. This creates more room for blood to flow. In most cases, the doctor then puts a stent in the artery. A stent is a small, wire-mesh tube. It presses against the walls of the artery. The stent is left in the artery to keep the artery open. This helps blood flow. What happens right after the procedure?   The catheter will be removed. A nurse or doctor may press on a bandage on the opening. Then a bandage or a compression device may be placed on your groin or wrist at the catheter insertion site. This prevents bleeding. After the procedure, you will be taken to a room where the catheter site and your heart rate, blood pressure, and temperature will be checked several times. If the catheter was put in your groin, you will need to lie still and keep your leg straight for several hours. If the catheter was put in your arm, you may be able to sit up and get out of bed right away. But you will need to keep your arm still for at least 1 hour. You may stay 1 night in the hospital. When you go home, you will get instructions from your doctor to help you recover well and prevent problems. Make sure to drink plenty of fluids (unless your doctor tells you not to) for several hours after the procedure. This will help flush the dye out of your body. Follow-up care is a key part of your treatment and safety. Be sure to make and go to all appointments, and call your doctor if you are having problems. It's also a good idea to know your test results and keep a list of the medicines you take. Where can you learn more? Go to http://www.gray.com/  Enter M058 in the search box to learn more about \"Learning About Coronary Angioplasty. \"  Current as of: December 16, 2019               Content Version: 12.6  © 1728-3678 [employer]. Care instructions adapted under license by [employer] (which disclaims liability or warranty for this information). If you have questions about a medical condition or this instruction, always ask your healthcare professional. [address] any warranty or liability for your use of this information.

## 2020-12-25 NOTE — ROUTINE PROCESS
Verbal bedside report given to Laurence Fothergill, oncoming RN. Patient's situation, background, assessment and recommendations provided. Opportunity for questions provided. Oncoming RN assumed care of patient. R radial site visualized.

## 2020-12-25 NOTE — PROGRESS NOTES
Discharge instructions were reviewed with patient. An opportunity was given for questions. All medications were reviewed, and information was given on the new medications. Patient verbalized understanding, and has no questions at this time.      IV removed; tele box returned

## 2020-12-29 LAB — ACT BLD: 422 SECS (ref 70–128)

## 2021-08-03 PROBLEM — R07.9 CHEST PAIN: Status: RESOLVED | Noted: 2019-03-06 | Resolved: 2021-08-03

## 2022-03-18 PROBLEM — R07.89 CHEST DISCOMFORT: Status: ACTIVE | Noted: 2020-12-09

## 2022-03-18 PROBLEM — R79.89 LOW TESTOSTERONE: Status: ACTIVE | Noted: 2019-10-17

## 2022-03-18 PROBLEM — E78.2 MIXED HYPERLIPIDEMIA: Status: ACTIVE | Noted: 2019-02-26

## 2022-03-18 PROBLEM — Z20.2 POSSIBLE EXPOSURE TO STD: Status: ACTIVE | Noted: 2019-06-11

## 2022-03-18 PROBLEM — A41.9 SEPSIS (HCC): Status: ACTIVE | Noted: 2019-06-12

## 2022-03-18 PROBLEM — F41.9 ANXIETY: Status: ACTIVE | Noted: 2019-03-06

## 2022-03-18 PROBLEM — I10 ESSENTIAL HYPERTENSION WITH GOAL BLOOD PRESSURE LESS THAN 130/85: Status: ACTIVE | Noted: 2019-02-26

## 2022-03-19 PROBLEM — N39.41 URGENCY INCONTINENCE: Status: ACTIVE | Noted: 2019-10-17

## 2022-03-19 PROBLEM — N40.1 BPH WITH OBSTRUCTION/LOWER URINARY TRACT SYMPTOMS: Status: ACTIVE | Noted: 2019-09-05

## 2022-03-19 PROBLEM — E29.1 HYPOGONADISM IN MALE: Status: ACTIVE | Noted: 2018-02-27

## 2022-03-19 PROBLEM — N30.01 ACUTE CYSTITIS WITH HEMATURIA: Status: ACTIVE | Noted: 2019-06-11

## 2022-03-19 PROBLEM — N13.8 BPH WITH OBSTRUCTION/LOWER URINARY TRACT SYMPTOMS: Status: ACTIVE | Noted: 2019-09-05

## 2022-03-19 PROBLEM — R93.1 ELEVATED CORONARY ARTERY CALCIUM SCORE: Status: ACTIVE | Noted: 2019-02-26

## 2022-03-19 PROBLEM — R07.9 CHEST PAIN AT REST: Status: ACTIVE | Noted: 2019-02-26

## 2022-03-19 PROBLEM — R30.0 DYSURIA: Status: ACTIVE | Noted: 2019-09-05

## 2022-03-19 PROBLEM — M19.011 GLENOHUMERAL ARTHRITIS, RIGHT: Status: ACTIVE | Noted: 2019-01-04

## 2022-03-19 PROBLEM — Z82.49 FAMILY HISTORY OF MI (MYOCARDIAL INFARCTION): Status: ACTIVE | Noted: 2019-02-26

## 2022-03-19 PROBLEM — R35.1 NOCTURIA MORE THAN TWICE PER NIGHT: Status: ACTIVE | Noted: 2019-09-05

## 2022-03-19 PROBLEM — N52.8 OTHER MALE ERECTILE DYSFUNCTION: Status: ACTIVE | Noted: 2020-08-05

## 2022-03-20 PROBLEM — I25.110 CORONARY ARTERY DISEASE INVOLVING NATIVE CORONARY ARTERY OF NATIVE HEART WITH UNSTABLE ANGINA PECTORIS (HCC): Status: ACTIVE | Noted: 2020-12-24

## 2022-03-20 PROBLEM — R79.89 ELEVATED LFTS: Status: ACTIVE | Noted: 2018-09-20

## 2022-03-20 PROBLEM — J45.20 MILD INTERMITTENT ASTHMA WITHOUT COMPLICATION: Status: ACTIVE | Noted: 2020-09-10

## 2022-03-20 PROBLEM — M19.012 OSTEOARTHRITIS OF LEFT GLENOHUMERAL JOINT: Status: ACTIVE | Noted: 2018-12-27

## 2022-04-22 ENCOUNTER — HOSPITAL ENCOUNTER (OUTPATIENT)
Dept: LAB | Age: 57
Discharge: HOME | End: 2022-04-22
Payer: COMMERCIAL

## 2022-04-22 DIAGNOSIS — Z95.5 H/O HEART ARTERY STENT: ICD-10-CM

## 2022-04-22 DIAGNOSIS — I25.119 ATHEROSCLEROSIS OF NATIVE CORONARY ARTERY OF NATIVE HEART WITH ANGINA PECTORIS (HCC): ICD-10-CM

## 2022-04-22 LAB
ALBUMIN SERPL-MCNC: 4.1 G/DL (ref 3.5–5)
ALBUMIN/GLOB SERPL: 1.1 {RATIO} (ref 1.2–3.5)
ALP SERPL-CCNC: 60 U/L (ref 50–136)
ALT SERPL-CCNC: 45 U/L (ref 12–65)
ANION GAP SERPL CALC-SCNC: 5 MMOL/L (ref 7–16)
AST SERPL-CCNC: 33 U/L (ref 15–37)
BASOPHILS # BLD: 0 K/UL (ref 0–0.2)
BASOPHILS NFR BLD: 0 % (ref 0–2)
BILIRUB SERPL-MCNC: 0.6 MG/DL (ref 0.2–1.1)
BUN SERPL-MCNC: 7 MG/DL (ref 6–23)
CALCIUM SERPL-MCNC: 9.8 MG/DL (ref 8.3–10.4)
CHLORIDE SERPL-SCNC: 103 MMOL/L (ref 98–107)
CHOLEST SERPL-MCNC: 197 MG/DL
CO2 SERPL-SCNC: 29 MMOL/L (ref 21–32)
CREAT SERPL-MCNC: 0.9 MG/DL (ref 0.8–1.5)
DIFFERENTIAL METHOD BLD: ABNORMAL
EOSINOPHIL # BLD: 0.3 K/UL (ref 0–0.8)
EOSINOPHIL NFR BLD: 5 % (ref 0.5–7.8)
ERYTHROCYTE [DISTWIDTH] IN BLOOD BY AUTOMATED COUNT: 12.6 % (ref 11.9–14.6)
GLOBULIN SER CALC-MCNC: 3.8 G/DL (ref 2.3–3.5)
GLUCOSE SERPL-MCNC: 100 MG/DL (ref 65–100)
HCT VFR BLD AUTO: 52.5 % (ref 41.1–50.3)
HDLC SERPL-MCNC: 48 MG/DL (ref 40–60)
HDLC SERPL: 4.1 {RATIO}
HGB BLD-MCNC: 17.7 G/DL (ref 13.6–17.2)
IMM GRANULOCYTES # BLD AUTO: 0 K/UL (ref 0–0.5)
IMM GRANULOCYTES NFR BLD AUTO: 0 % (ref 0–5)
LDLC SERPL CALC-MCNC: 114.8 MG/DL
LYMPHOCYTES # BLD: 1.4 K/UL (ref 0.5–4.6)
LYMPHOCYTES NFR BLD: 26 % (ref 13–44)
MCH RBC QN AUTO: 29.9 PG (ref 26.1–32.9)
MCHC RBC AUTO-ENTMCNC: 33.7 G/DL (ref 31.4–35)
MCV RBC AUTO: 88.8 FL (ref 79.6–97.8)
MONOCYTES # BLD: 0.6 K/UL (ref 0.1–1.3)
MONOCYTES NFR BLD: 11 % (ref 4–12)
NEUTS SEG # BLD: 3.1 K/UL (ref 1.7–8.2)
NEUTS SEG NFR BLD: 58 % (ref 43–78)
NRBC # BLD: 0 K/UL (ref 0–0.2)
PLATELET # BLD AUTO: 221 K/UL (ref 150–450)
PMV BLD AUTO: 9.3 FL (ref 9.4–12.3)
POTASSIUM SERPL-SCNC: 4.3 MMOL/L (ref 3.5–5.1)
PROT SERPL-MCNC: 7.9 G/DL (ref 6.3–8.2)
RBC # BLD AUTO: 5.91 M/UL (ref 4.23–5.6)
SODIUM SERPL-SCNC: 137 MMOL/L (ref 138–145)
TRIGL SERPL-MCNC: 171 MG/DL (ref 35–150)
VLDLC SERPL CALC-MCNC: 34.2 MG/DL (ref 6–23)
WBC # BLD AUTO: 5.4 K/UL (ref 4.3–11.1)

## 2022-04-22 PROCEDURE — 80061 LIPID PANEL: CPT

## 2022-04-22 PROCEDURE — 36415 COLL VENOUS BLD VENIPUNCTURE: CPT

## 2022-04-22 PROCEDURE — 85025 COMPLETE CBC W/AUTO DIFF WBC: CPT

## 2022-04-22 PROCEDURE — 80053 COMPREHEN METABOLIC PANEL: CPT

## 2022-04-25 NOTE — PROGRESS NOTES
Please call the patient regarding their result. Liver and kidney numbers normal range. Cholesterol number are improved compared to last two measurements. Red blood cells are elevated, would recommend seeing PCP to evaluate.

## 2022-07-29 ENCOUNTER — TELEPHONE (OUTPATIENT)
Dept: CARDIOLOGY CLINIC | Age: 57
End: 2022-07-29

## 2022-07-29 NOTE — LETTER
[address] [place] [address], [address] [place] [address]  [phone]  _____________________________________      PRE-OP RISK ASSESSMENT    Remedios Lira  1965    Diona Goodness Merlino will be scheduled for Left Total shoulder Arthroplasty  with Dr. Esther Bran. Office is asking to have a cardiac clearance for the patient. Maria Ejeanine Lira is considered LOW cardiovascular risk for this procedure. If required by surgeon he is low risk to hold aspirin for 7 days prior to procedure. But should resume  ASAP following his operation once ok with surgeon.  .          Thank you,               8/5/2022  8:31 AM

## 2022-07-29 NOTE — TELEPHONE ENCOUNTER
Patient having Left Total shoulder Arthroplasty with Dr.Kissenberth MARISCAL under General. Need cardiac clearance Fx: [phone]

## 2022-08-02 NOTE — TELEPHONE ENCOUNTER
Patient is low risk for orthopedic surgery. If required by surgeon he is low risk to hold aspirin for 7 days prior to procedure, resume ASAP following his operation once ok with surgeon.

## 2022-09-06 ENCOUNTER — TELEPHONE (OUTPATIENT)
Dept: CARDIOLOGY CLINIC | Age: 57
End: 2022-09-06

## 2022-09-06 NOTE — TELEPHONE ENCOUNTER
Pt.has been having chest discomfort like prior to previous blockage. He is wanting an appt. ASAP.   Appt.made tomorrow w//

## 2022-09-07 ENCOUNTER — OFFICE VISIT (OUTPATIENT)
Dept: CARDIOLOGY CLINIC | Age: 57
End: 2022-09-07
Payer: COMMERCIAL

## 2022-09-07 VITALS
WEIGHT: 217 LBS | DIASTOLIC BLOOD PRESSURE: 84 MMHG | HEIGHT: 72 IN | BODY MASS INDEX: 29.39 KG/M2 | SYSTOLIC BLOOD PRESSURE: 144 MMHG | HEART RATE: 60 BPM

## 2022-09-07 DIAGNOSIS — I25.110 CORONARY ARTERY DISEASE INVOLVING NATIVE CORONARY ARTERY OF NATIVE HEART WITH UNSTABLE ANGINA PECTORIS (HCC): Primary | ICD-10-CM

## 2022-09-07 DIAGNOSIS — I25.110 CORONARY ARTERY DISEASE INVOLVING NATIVE CORONARY ARTERY OF NATIVE HEART WITH UNSTABLE ANGINA PECTORIS (HCC): ICD-10-CM

## 2022-09-07 DIAGNOSIS — E78.00 PURE HYPERCHOLESTEROLEMIA: ICD-10-CM

## 2022-09-07 DIAGNOSIS — R07.89 CHEST DISCOMFORT: ICD-10-CM

## 2022-09-07 DIAGNOSIS — Z95.820 S/P ANGIOPLASTY WITH STENT: ICD-10-CM

## 2022-09-07 DIAGNOSIS — I10 ESSENTIAL HYPERTENSION WITH GOAL BLOOD PRESSURE LESS THAN 130/85: ICD-10-CM

## 2022-09-07 LAB
ANION GAP SERPL CALC-SCNC: 6 MMOL/L (ref 4–13)
BUN SERPL-MCNC: 9 MG/DL (ref 6–23)
CALCIUM SERPL-MCNC: 9.4 MG/DL (ref 8.3–10.4)
CHLORIDE SERPL-SCNC: 103 MMOL/L (ref 101–110)
CO2 SERPL-SCNC: 28 MMOL/L (ref 21–32)
CREAT SERPL-MCNC: 0.9 MG/DL (ref 0.8–1.5)
ERYTHROCYTE [DISTWIDTH] IN BLOOD BY AUTOMATED COUNT: 13.1 % (ref 11.9–14.6)
GLUCOSE SERPL-MCNC: 103 MG/DL (ref 65–100)
HCT VFR BLD AUTO: 50.1 % (ref 41.1–50.3)
HGB BLD-MCNC: 16.8 G/DL (ref 13.6–17.2)
MCH RBC QN AUTO: 30.7 PG (ref 26.1–32.9)
MCHC RBC AUTO-ENTMCNC: 33.5 G/DL (ref 31.4–35)
MCV RBC AUTO: 91.4 FL (ref 79.6–97.8)
NRBC # BLD: 0 K/UL (ref 0–0.2)
PLATELET # BLD AUTO: 230 K/UL (ref 150–450)
PMV BLD AUTO: 9.5 FL (ref 9.4–12.3)
POTASSIUM SERPL-SCNC: 4.2 MMOL/L (ref 3.5–5.1)
RBC # BLD AUTO: 5.48 M/UL (ref 4.23–5.6)
SODIUM SERPL-SCNC: 137 MMOL/L (ref 138–145)
WBC # BLD AUTO: 5.5 K/UL (ref 4.3–11.1)

## 2022-09-07 PROCEDURE — 93000 ELECTROCARDIOGRAM COMPLETE: CPT | Performed by: INTERNAL MEDICINE

## 2022-09-07 PROCEDURE — 99214 OFFICE O/P EST MOD 30 MIN: CPT | Performed by: INTERNAL MEDICINE

## 2022-09-07 ASSESSMENT — ENCOUNTER SYMPTOMS: SHORTNESS OF BREATH: 0

## 2022-09-07 NOTE — PROGRESS NOTES
[address], [address] [employer] [facility], [address] [place] [address]  PHONE: [phone]    Hoa Mullen  1965      SUBJECTIVE:   Hoa Mullen is a 59 y.o. male seen for a follow up visit regarding the following:     Chief Complaint   Patient presents with    Chest Pain       HPI:    57-year-old gentleman with Dr. Adeline Hillman worked in today for some recurrence of chest discomfort. Patient's had chest discomfort her heart cath in 2019 with some diffuse mild nonobstructive plaque at the time. He then had recurrent chest pain in 2020 had a nuclear stress test which she had a chest discomfort but had a sort of a fixed inferior defect. He continued had the pain and had a repeat catheterization which then revealed a 95% lesion of the posterolateral branch of the right coronary artery was successfully stented. After that he did much better but recently has been noticing the same, pain was more on the right side some goes to the back not quite as intense as it was for the stent. However he came in to see me today. Occurs more at rest and with exertion. There is been no prolonged discomfort. He is also planning to have shoulder replacement upcoming in October      Past Medical History, Past Surgical History, Family history, Social History, and Medications were all reviewed with the patient today and updated as necessary. Allergies   Allergen Reactions    Gadobutrol Hives and Itching     Pt.  Broke out in hives,itching all over his body and throat feeling itchy and tight    Iodine Rash     Past Medical History:   Diagnosis Date    Arthritis     Chronic cough 10/2/2014    Coronary artery disease involving bypass graft of transplanted heart with unstable angina pectoris ([address] 12/24/2020    Family history of aortic aneurysm 10/2/2014    Glaucoma 2018    Hyperlipidemia     Mild intermittent asthma without complication 3/39/3181    Paroxysmal tachycardia ([address]     Post-nasal drip 10/2/2014    Psychiatric disorder     PTSD    Varicose veins of other specified sites      Past Surgical History:   Procedure Laterality Date    TONSILLECTOMY       Family History   Problem Relation Age of Onset    Bleeding Prob Father       Social History     Tobacco Use    Smoking status: Never    Smokeless tobacco: Never   Substance Use Topics    Alcohol use: Yes     Alcohol/week: 3.0 - 5.0 standard drinks       ROS:    Review of Systems   Constitutional: Negative for decreased appetite. Cardiovascular:  Positive for chest pain. Negative for dyspnea on exertion, leg swelling, palpitations and paroxysmal nocturnal dyspnea. Respiratory:  Negative for shortness of breath. Hematologic/Lymphatic: Negative for bleeding problem. PHYSICAL EXAM:    BP (!) 144/84   Pulse 60   Ht 6' (1.829 m)   Wt 217 lb (98.4 kg)   BMI 29.43 kg/m²        Wt Readings from Last 3 Encounters:   09/07/22 217 lb (98.4 kg)   03/17/22 237 lb (107.5 kg)   01/03/22 223 lb (101.2 kg)     BP Readings from Last 3 Encounters:   09/07/22 (!) 144/84   03/17/22 124/80   01/03/22 138/84         Physical Exam  Constitutional:       General: He is not in acute distress. Cardiovascular:      Rate and Rhythm: Normal rate and regular rhythm. Heart sounds: No murmur heard. No gallop. Pulmonary:      Effort: Pulmonary effort is normal.      Breath sounds: Normal breath sounds. Abdominal:      General: Abdomen is flat. Neurological:      Mental Status: He is alert. Medical problems and test results were reviewed with the patient today. Results for orders placed or performed in visit on 09/07/22   EKG 12 Lead    Impression    Normal sinus rhythm rate of 59. First-degree AV block.   No significant ST segment changes     Lab Results   Component Value Date/Time     04/22/2022 09:32 AM    K 4.3 04/22/2022 09:32 AM     04/22/2022 09:32 AM    CO2 29 04/22/2022 09:32 AM    BUN 7 04/22/2022 09:32 AM    GFRAA >60 04/22/2022 09:32 AM     Lab Results   Component Value Date/Time    CHOL 197 04/22/2022 09:32 AM    HDL 48 04/22/2022 09:32 AM    VLDL 65 05/06/2021 10:22 AM         ASSESSMENT and PLAN    Franklin was seen today for chest pain. Diagnoses and all orders for this visit:    Coronary artery disease involving native coronary artery of native heart with unstable angina pectoris ([address] is post stent to the branch of the right coronary artery in 2020  -     Cancel: EKG 12 Lead; Future  -     EKG 12 Lead    Essential hypertension with goal blood pressure less than 130/85  -     Cancel: EKG 12 Lead; Future  -     EKG 12 Lead    Chest discomfort patient started have some recurrent chest discomfort similar to what he had prior to his stent in 2020. He had a drug-eluting stent to the probably posterolateral branch of the right. The nuclear study before that showed more of a fixed inferior defect but probably was real that he had a 95% lesion. He did well after the stent was started having some recurrence of symptoms. He is having upcoming surgery. I think the best option for him particularly with his previous studies would just be to repeat the cardiac catheterization. He is agreeable to do that we will set him up from a radial approach. He knows the procedure risk. He will stay on his current medications. He does not like taking the beta-blocker he quit taking that. -     Cancel: EKG 12 Lead; Future  -     EKG 12 Lead        [unfilled]      No follow-up provider specified.     Zayda Lebron MD  9/7/2022  10:28 AM

## 2022-09-08 NOTE — PROGRESS NOTES
Patient pre-assessment complete for Waldo Ramirez scheduled for [address] [facility], arrival time 0600. Patient verified using . Patient instructed to bring a list of all home medications on the day of procedure. NPO status reinforced. Patient informed to take a full dose aspirin 325mg  or 81 mg x 4 on the day of procedure. Instructed they can take all other medications excluding vitamins & supplements. Patient verbalizes understanding of all instructions & denies any questions at this time.

## 2022-09-09 ENCOUNTER — HOSPITAL ENCOUNTER (OUTPATIENT)
Age: 57
Setting detail: OUTPATIENT SURGERY
Discharge: HOME | End: 2022-09-09
Attending: INTERNAL MEDICINE | Admitting: INTERNAL MEDICINE
Payer: COMMERCIAL

## 2022-09-09 VITALS
SYSTOLIC BLOOD PRESSURE: 126 MMHG | TEMPERATURE: 98.2 F | DIASTOLIC BLOOD PRESSURE: 89 MMHG | RESPIRATION RATE: 14 BRPM | HEART RATE: 66 BPM | OXYGEN SATURATION: 92 %

## 2022-09-09 DIAGNOSIS — R07.9 CHEST PAIN: ICD-10-CM

## 2022-09-09 LAB
EKG ATRIAL RATE: 64 BPM
EKG DIAGNOSIS: NORMAL
EKG P AXIS: 38 DEGREES
EKG P-R INTERVAL: 210 MS
EKG Q-T INTERVAL: 426 MS
EKG QRS DURATION: 100 MS
EKG QTC CALCULATION (BAZETT): 439 MS
EKG R AXIS: -17 DEGREES
EKG T AXIS: 9 DEGREES
EKG VENTRICULAR RATE: 64 BPM

## 2022-09-09 PROCEDURE — 2580000003 HC RX 258: Performed by: INTERNAL MEDICINE

## 2022-09-09 PROCEDURE — C1769 GUIDE WIRE: HCPCS | Performed by: INTERNAL MEDICINE

## 2022-09-09 PROCEDURE — 99152 MOD SED SAME PHYS/QHP 5/>YRS: CPT | Performed by: INTERNAL MEDICINE

## 2022-09-09 PROCEDURE — C1887 CATHETER, GUIDING: HCPCS | Performed by: INTERNAL MEDICINE

## 2022-09-09 PROCEDURE — 93458 L HRT ARTERY/VENTRICLE ANGIO: CPT | Performed by: INTERNAL MEDICINE

## 2022-09-09 PROCEDURE — 2709999900 HC NON-CHARGEABLE SUPPLY: Performed by: INTERNAL MEDICINE

## 2022-09-09 PROCEDURE — 2500000003 HC RX 250 WO HCPCS: Performed by: INTERNAL MEDICINE

## 2022-09-09 PROCEDURE — 93005 ELECTROCARDIOGRAM TRACING: CPT | Performed by: INTERNAL MEDICINE

## 2022-09-09 PROCEDURE — C1894 INTRO/SHEATH, NON-LASER: HCPCS | Performed by: INTERNAL MEDICINE

## 2022-09-09 PROCEDURE — A4216 STERILE WATER/SALINE, 10 ML: HCPCS | Performed by: INTERNAL MEDICINE

## 2022-09-09 PROCEDURE — 6360000002 HC RX W HCPCS: Performed by: INTERNAL MEDICINE

## 2022-09-09 PROCEDURE — 6360000004 HC RX CONTRAST MEDICATION: Performed by: INTERNAL MEDICINE

## 2022-09-09 RX ORDER — SODIUM CHLORIDE 9 MG/ML
INJECTION, SOLUTION INTRAVENOUS PRN
OUTPATIENT
Start: 2022-09-09

## 2022-09-09 RX ORDER — DIPHENHYDRAMINE HYDROCHLORIDE 50 MG/ML
25 INJECTION INTRAMUSCULAR; INTRAVENOUS ONCE
Status: COMPLETED | OUTPATIENT
Start: 2022-09-09 | End: 2022-09-09

## 2022-09-09 RX ORDER — MIDAZOLAM HYDROCHLORIDE 1 MG/ML
INJECTION INTRAMUSCULAR; INTRAVENOUS PRN
Status: DISCONTINUED | OUTPATIENT
Start: 2022-09-09 | End: 2022-09-09 | Stop reason: HOSPADM

## 2022-09-09 RX ORDER — HEPARIN SODIUM 200 [USP'U]/100ML
INJECTION, SOLUTION INTRAVENOUS CONTINUOUS PRN
Status: DISCONTINUED | OUTPATIENT
Start: 2022-09-09 | End: 2022-09-09 | Stop reason: HOSPADM

## 2022-09-09 RX ORDER — SODIUM CHLORIDE 9 MG/ML
INJECTION, SOLUTION INTRAVENOUS CONTINUOUS
Status: DISCONTINUED | OUTPATIENT
Start: 2022-09-09 | End: 2022-09-09 | Stop reason: HOSPADM

## 2022-09-09 RX ORDER — SODIUM CHLORIDE 0.9 % (FLUSH) 0.9 %
5-40 SYRINGE (ML) INJECTION EVERY 12 HOURS SCHEDULED
OUTPATIENT
Start: 2022-09-09

## 2022-09-09 RX ORDER — LIDOCAINE HYDROCHLORIDE 10 MG/ML
INJECTION, SOLUTION INFILTRATION; PERINEURAL PRN
Status: DISCONTINUED | OUTPATIENT
Start: 2022-09-09 | End: 2022-09-09 | Stop reason: HOSPADM

## 2022-09-09 RX ORDER — ACETAMINOPHEN 325 MG/1
650 TABLET ORAL EVERY 4 HOURS PRN
OUTPATIENT
Start: 2022-09-09

## 2022-09-09 RX ORDER — ASPIRIN 81 MG/1
324 TABLET, CHEWABLE ORAL ONCE
Status: DISCONTINUED | OUTPATIENT
Start: 2022-09-09 | End: 2022-09-09 | Stop reason: HOSPADM

## 2022-09-09 RX ORDER — SODIUM CHLORIDE 0.9 % (FLUSH) 0.9 %
5-40 SYRINGE (ML) INJECTION PRN
OUTPATIENT
Start: 2022-09-09

## 2022-09-09 RX ADMIN — HYDROCORTISONE SODIUM SUCCINATE 100 MG: 100 INJECTION, POWDER, FOR SOLUTION INTRAMUSCULAR; INTRAVENOUS at 07:18

## 2022-09-09 RX ADMIN — DIPHENHYDRAMINE HYDROCHLORIDE 50 MG: 50 INJECTION, SOLUTION INTRAMUSCULAR; INTRAVENOUS at 07:19

## 2022-09-09 RX ADMIN — FAMOTIDINE 20 MG: 10 INJECTION, SOLUTION INTRAVENOUS at 07:19

## 2022-09-09 NOTE — PROGRESS NOTES
TRANSFER - IN REPORT:    Verbal report received from LESLEY Berry on Radha Goodrich  being received from [facility] for routine progression of patient care      Report consisted of patient's Situation, Background, Assessment and   Recommendations(SBAR). Information from the following report(s) Nurse Handoff Report was reviewed with the receiving nurse. Opportunity for questions and clarification was provided. Assessment completed upon patient's arrival to unit and care assumed.

## 2022-09-09 NOTE — PROGRESS NOTES
Assisted OOB & ambulated to BR & on unit. Tolerated activity without difficulty. R radial site without bleeding or hematoma.

## 2022-09-09 NOTE — DISCHARGE INSTRUCTIONS
HEART CATHETERIZATION/ANGIOGRAPHY DISCHARGE INSTRUCTIONS    Check puncture site frequently for swelling or bleeding. If there is any bleeding, apply pressure over the area with a clean towel or washcloth and call 911. Notify your doctor for any redness, swelling, drainage, or oozing from the puncture site. Notify your doctor for any fever or chills. If the extremity becomes cold, numb, or painful call Dr. Elsie Contreras at [phone]. Activity should be limited for the next 48 hours. No heavy lifting, pushing, pulling  or strenuous activity for 48 hours. No heavy lifting (anything over 10 pounds) for 3 days. You may resume your usual diet. Drink more fluids than usual.  Have a responsible person drive you home and stay with you for at least 24 hours after your heart catheterization/angiography. You may remove bandage from your R wrist  24 hours. You may shower in 24 hours. No tub baths, hot tubs, or swimming for 1 week. Do not place any lotions, creams, powders, or ointments over puncture site for 1 week. You may place a clean band-aid over the puncture site each day for 5 days. Change daily. Sedation for a Medical Procedure: Care Instructions     You were given a sedative medication during your visit. While many of the effects will have worn   off before you leave; you may continue to feel some effects for several hours. Common side effects from sedation include:  Feeling sleepy. (Your doctors and nurses will make sure you are not too sleepy to go home.)  Nausea and vomiting. This usually does not last long. Feeling tired. How can you care for yourself at home? Activity    Don't do anything for 24 hours that requires attention to detail. It takes time for the medicine effects to completely wear off. Do not make important legal decisions for 24 hours. Do not sign any legal documents for 24 hours.      Do not drink alcohol today     For your safety, you should not drive or operate heavy machinery for the remainder of the day     Rest when you feel tired. Getting enough sleep will help you recover. Diet    You can eat your normal diet, unless your doctor gives you other instructions. If your stomach is upset, try clear liquids and bland, low-fat foods like plain toast or rice. Drink plenty of fluids (unless your doctor tells you not to). Don't drink alcohol for 24 hours. Medicines    Be safe with medicines. Read and follow all instructions on the label. If the doctor gave you a prescription medicine for pain, take it as prescribed. If you are not taking a prescription pain medicine, ask your doctor if you can take an over-the-counter medicine. If you think your pain medicine is making you sick to your stomach: Take your medicine after meals (unless your doctor has told you not to). Ask your doctor for a different pain medicine. I have read the above instructions and have had the opportunity to ask questions.       Patient: ________________________   Date: _____________    Witness: _______________________   Date: _____________

## 2022-09-09 NOTE — Clinical Note
TRANSFER - OUT REPORT:     Verbal report given to: RN. Report consisted of patient's Situation, Background, Assessment and   Recommendations(SBAR). Opportunity for questions and clarification was provided. Patient transported with a Registered Nurse. Patient transported to: [facility].

## 2022-09-09 NOTE — PROGRESS NOTES
R band deflation completed. Right radial dressed with tegaderm using sterile technique. No bleeding or hematoma. Tolerated without difficulty. Discharge instructions given per orders, voiced good understanding of R radial site care, medications & follow up care. Denies any questions. Patient wheeled out to private vehicle via RN.

## 2022-09-09 NOTE — PROGRESS NOTES
Patient received to [address] [place] [address] room # 10  Ambulatory from [facility]. Patient scheduled for [facility] today with Dr Eric Morales. Procedure reviewed & questions answered, voiced good understanding consent obtained & placed on chart. All medications and medical history reviewed. Will prep patient per orders. Patient & family updated on plan of care.       The patient has a fraility score of 3-MANAGING WELL, based on reports no recent falls    Took  mg this am.

## 2022-09-09 NOTE — PROGRESS NOTES
TRANSFER - OUT REPORT:    [facility] with Dr. Rogelio Galvan  Access: Right radial   No intervention    Tr band applied to right radial with 14 ml in band   No bleeding or hematoma site soft    MAR  2 mg Versed  25 mcg Fentanyl   5,000 units heparin    Verbal report given to Marie Goins  being transferred to [address] [place] [address] for routine progression of patient care       Report consisted of patient's Situation, Background, Assessment and Recommendations(SBAR). Information from the following report(s) Nurse Handoff Report and MAR was reviewed with the receiving nurse. Opportunity for questions and clarification was provided.       Patient transported with:  Registered Nurse

## 2022-09-09 NOTE — PROGRESS NOTES
Patient received into CPRU room 3, post cardiac catheterization. Access site without bleeding or swelling. Patient instructed to limit movement of right upper extremity. Routine post procedural vital signs & site assessment initiated. Patient provided PO fluids, call bell within reach. No needs expressed at this time.

## 2022-09-16 ENCOUNTER — TELEPHONE (OUTPATIENT)
Dept: CARDIOLOGY CLINIC | Age: 57
End: 2022-09-16

## 2022-09-16 NOTE — TELEPHONE ENCOUNTER
Patient having Left Total shoulder on 10/18/22 with Dr. Rey Wilder under General Brachial Plexus Catheter. Requesting medication hold for any medication and cardiac clearance.  Fax: [phone]

## 2022-09-22 NOTE — TELEPHONE ENCOUNTER
See Physician's response below. Faxed to [phone]. \"Patient stable for surgery he just recently had a heart catheterization which was stable. I do not think he needs to hold the baby aspirin last absolute indicated by the surgeon. \"  Wade Bull MD

## 2022-10-04 ENCOUNTER — PATIENT MESSAGE (OUTPATIENT)
Dept: UROLOGY | Age: 57
End: 2022-10-04

## 2022-10-04 DIAGNOSIS — N13.8 OTHER OBSTRUCTIVE AND REFLUX UROPATHY: ICD-10-CM

## 2022-10-04 DIAGNOSIS — N52.9 ERECTILE DYSFUNCTION, UNSPECIFIED ERECTILE DYSFUNCTION TYPE: ICD-10-CM

## 2022-10-04 DIAGNOSIS — N40.1 BENIGN PROSTATIC HYPERPLASIA WITH LOWER URINARY TRACT SYMPTOMS, SYMPTOM DETAILS UNSPECIFIED: ICD-10-CM

## 2022-10-04 DIAGNOSIS — R79.89 OTHER SPECIFIED ABNORMAL FINDINGS OF BLOOD CHEMISTRY: Primary | ICD-10-CM

## 2022-10-04 RX ORDER — TESTOSTERONE ENANTHATE 75 MG/.5ML
1 INJECTION SUBCUTANEOUS
Qty: 12 ADJUSTABLE DOSE PRE-FILLED PEN SYRINGE | Refills: 1 | Status: SHIPPED | OUTPATIENT
Start: 2022-10-04 | End: 2023-04-02

## 2022-10-04 NOTE — TELEPHONE ENCOUNTER
From: Issa Hanks  To: Carol Nicole  Sent: 10/4/2022 9:35 AM EDT  Subject: Alonzooseas Angie    This is Rich Merlino, I am running low on Xyosted and need a refill. I believe that when I was in last you told me that I only needed to come in once a year now for the refill. If not let me know so I can come in.   Thanks, Gilberto Saucedo

## 2022-10-10 ENCOUNTER — OFFICE VISIT (OUTPATIENT)
Dept: CARDIOLOGY CLINIC | Age: 57
End: 2022-10-10
Payer: COMMERCIAL

## 2022-10-10 VITALS
DIASTOLIC BLOOD PRESSURE: 84 MMHG | BODY MASS INDEX: 29.5 KG/M2 | WEIGHT: 217.8 LBS | SYSTOLIC BLOOD PRESSURE: 130 MMHG | HEART RATE: 68 BPM | HEIGHT: 72 IN

## 2022-10-10 DIAGNOSIS — I10 ESSENTIAL HYPERTENSION WITH GOAL BLOOD PRESSURE LESS THAN 130/85: ICD-10-CM

## 2022-10-10 DIAGNOSIS — E78.00 PURE HYPERCHOLESTEROLEMIA: ICD-10-CM

## 2022-10-10 DIAGNOSIS — I25.110 CORONARY ARTERY DISEASE INVOLVING NATIVE CORONARY ARTERY OF NATIVE HEART WITH UNSTABLE ANGINA PECTORIS (HCC): Primary | ICD-10-CM

## 2022-10-10 PROCEDURE — 99213 OFFICE O/P EST LOW 20 MIN: CPT | Performed by: INTERNAL MEDICINE

## 2022-10-10 RX ORDER — UBIDECARENONE 100 MG
CAPSULE ORAL DAILY
COMMUNITY

## 2022-10-10 RX ORDER — CALCIUM CARBONATE 300MG(750)
TABLET,CHEWABLE ORAL DAILY
COMMUNITY

## 2022-10-10 NOTE — PROGRESS NOTES
[address], [address] [facility], [address] [place] [address]  PHONE: [phone]    Garima Hammer  1965      SUBJECTIVE:   Garima Hammer is a 59 y.o. male seen for a follow up visit regarding the following:     Chief Complaint   Patient presents with    Follow-Up from Hospital     Had Cath    Chest Pain       HPI:    52-year-old male comes back for follow-up of the heart cath. She had a history of a stent to the distal right coronary vessel in the past been presented with recurrent type symptoms in the place of Dr. Gavi Hopson who sees him regularly. We went ahead and repeated his heart cath and that showed some mild irregularity but the stent was widely patent and there is no significant stenosis in any of the vessels. I reviewed those images and showed them to him. Past Medical History, Past Surgical History, Family history, Social History, and Medications were all reviewed with the patient today and updated as necessary. Allergies   Allergen Reactions    Gadobutrol Hives and Itching     Pt.  Broke out in hives,itching all over his body and throat feeling itchy and tight    Iodine Rash     Past Medical History:   Diagnosis Date    Arthritis     Chronic cough 10/2/2014    Coronary artery disease involving bypass graft of transplanted heart with unstable angina pectoris ([address] 12/24/2020    Family history of aortic aneurysm 10/2/2014    Glaucoma 2018    Hyperlipidemia     Mild intermittent asthma without complication 6/11/9322    Paroxysmal tachycardia (HCC)     Post-nasal drip 10/2/2014    Psychiatric disorder     PTSD    Varicose veins of other specified sites      Past Surgical History:   Procedure Laterality Date    CARDIAC PROCEDURE N/A 9/9/2022    Left heart cath / coronary angiography performed by Sofiya Yoo MD at [facility] CATH LAB    TONSILLECTOMY       Family History   Problem Relation Age of Onset    Bleeding Prob Father       Social History     Tobacco Use    Smoking status: Never    Smokeless tobacco: Never   Substance Use Topics    Alcohol use: Yes     Alcohol/week: 3.0 - 5.0 standard drinks       ROS:    Review of Systems   Constitutional: Negative for decreased appetite. Cardiovascular:  Negative for chest pain, dyspnea on exertion and irregular heartbeat. PHYSICAL EXAM:    /84   Pulse 68   Ht 6' (1.829 m)   Wt 217 lb 12.8 oz (98.8 kg)   BMI 29.54 kg/m²        Wt Readings from Last 3 Encounters:   10/10/22 217 lb 12.8 oz (98.8 kg)   09/07/22 217 lb (98.4 kg)   03/17/22 237 lb (107.5 kg)     BP Readings from Last 3 Encounters:   10/10/22 130/84   09/09/22 126/89   09/07/22 (!) 144/84         Physical Exam  Constitutional:       General: He is not in acute distress. Cardiovascular:      Rate and Rhythm: Normal rate and regular rhythm. Heart sounds: No murmur heard. No gallop. Musculoskeletal:         General: No swelling. Neurological:      Mental Status: He is alert. Medical problems and test results were reviewed with the patient today. No results found for any visits on 10/10/22. Lab Results   Component Value Date/Time     09/07/2022 11:19 AM    K 4.2 09/07/2022 11:19 AM     09/07/2022 11:19 AM    CO2 28 09/07/2022 11:19 AM    BUN 9 09/07/2022 11:19 AM    GFRAA >60 09/07/2022 11:19 AM     Lab Results   Component Value Date/Time    CHOL 197 04/22/2022 09:32 AM    HDL 48 04/22/2022 09:32 AM    VLDL 65 05/06/2021 10:22 AM         ASSESSMENT and PLAN    Franklin was seen today for follow-up from hospital and chest pain. Diagnoses and all orders for this visit:    Coronary artery disease involving native coronary artery of native heart with unstable angina pectoris [facility]) repeat catheterization showed no significant obstructive disease at this time. I went back through that with him.   He is stable to have shoulder surgery    Essential hypertension with goal blood pressure less than 130/85 he will continue his amlodipine    Pure hypercholesterolemia he is on Crestor 40 we will continue that at this time. With his recent heart cath results he is stable for shoulder surgery    [unfilled]      No follow-up provider specified.     Ramiro Logan MD  10/10/2022  5:34 PM

## 2023-01-04 NOTE — TELEPHONE ENCOUNTER
MEDICATION REFILL REQUEST      Name of Medication:  Icosapent Ethyl  Dose:  1 gram  Frequency:    Quantity:    Days' supply:  80      Pharmacy Name/Location:  did not leave

## 2023-01-05 RX ORDER — ICOSAPENT ETHYL 1000 MG/1
2 CAPSULE ORAL 2 TIMES DAILY WITH MEALS
Qty: 180 CAPSULE | Refills: 3 | Status: SHIPPED | OUTPATIENT
Start: 2023-01-05

## 2023-01-05 NOTE — TELEPHONE ENCOUNTER
Requested Prescriptions     Pending Prescriptions Disp Refills    Icosapent Ethyl (VASCEPA) 1 g CAPS capsule 180 capsule 3     Sig: Take 2 capsules by mouth 2 times daily (with meals)    Send to  to sign.

## 2023-02-26 DIAGNOSIS — R79.89 OTHER SPECIFIED ABNORMAL FINDINGS OF BLOOD CHEMISTRY: Primary | ICD-10-CM

## 2023-02-26 DIAGNOSIS — N40.1 BENIGN PROSTATIC HYPERPLASIA WITH LOWER URINARY TRACT SYMPTOMS, SYMPTOM DETAILS UNSPECIFIED: ICD-10-CM

## 2023-02-26 DIAGNOSIS — N52.9 ERECTILE DYSFUNCTION, UNSPECIFIED ERECTILE DYSFUNCTION TYPE: ICD-10-CM

## 2023-02-27 DIAGNOSIS — N52.9 ERECTILE DYSFUNCTION, UNSPECIFIED ERECTILE DYSFUNCTION TYPE: ICD-10-CM

## 2023-02-27 DIAGNOSIS — N40.1 BENIGN PROSTATIC HYPERPLASIA WITH LOWER URINARY TRACT SYMPTOMS, SYMPTOM DETAILS UNSPECIFIED: ICD-10-CM

## 2023-02-27 DIAGNOSIS — R79.89 OTHER SPECIFIED ABNORMAL FINDINGS OF BLOOD CHEMISTRY: ICD-10-CM

## 2023-02-27 LAB
ALBUMIN SERPL-MCNC: 3.7 G/DL (ref 3.5–5)
ALBUMIN/GLOB SERPL: 1.1 (ref 0.4–1.6)
ALP SERPL-CCNC: 54 U/L (ref 50–136)
ALT SERPL-CCNC: 18 U/L (ref 12–65)
AST SERPL-CCNC: 20 U/L (ref 15–37)
BILIRUB DIRECT SERPL-MCNC: 0.1 MG/DL
BILIRUB SERPL-MCNC: 0.5 MG/DL (ref 0.2–1.1)
ERYTHROCYTE [DISTWIDTH] IN BLOOD BY AUTOMATED COUNT: 12.3 % (ref 11.9–14.6)
GLOBULIN SER CALC-MCNC: 3.3 G/DL (ref 2.8–4.5)
HCT VFR BLD AUTO: 50.2 % (ref 41.1–50.3)
HGB BLD-MCNC: 16.8 G/DL (ref 13.6–17.2)
MCH RBC QN AUTO: 30.6 PG (ref 26.1–32.9)
MCHC RBC AUTO-ENTMCNC: 33.5 G/DL (ref 31.4–35)
MCV RBC AUTO: 91.4 FL (ref 82–102)
NRBC # BLD: 0 K/UL (ref 0–0.2)
PLATELET # BLD AUTO: 254 K/UL (ref 150–450)
PMV BLD AUTO: 9.6 FL (ref 9.4–12.3)
PROT SERPL-MCNC: 7 G/DL (ref 6.3–8.2)
PSA SERPL-MCNC: 2.7 NG/ML
RBC # BLD AUTO: 5.49 M/UL (ref 4.23–5.6)
WBC # BLD AUTO: 4.9 K/UL (ref 4.3–11.1)

## 2023-03-01 LAB — TESTOST SERPL-MCNC: 383 NG/DL (ref 264–916)

## 2023-03-02 ENCOUNTER — OFFICE VISIT (OUTPATIENT)
Dept: UROLOGY | Age: 58
End: 2023-03-02
Payer: COMMERCIAL

## 2023-03-02 DIAGNOSIS — E29.1 HYPOGONADISM IN MALE: Primary | ICD-10-CM

## 2023-03-02 DIAGNOSIS — N52.9 ERECTILE DYSFUNCTION, UNSPECIFIED ERECTILE DYSFUNCTION TYPE: ICD-10-CM

## 2023-03-02 DIAGNOSIS — N40.1 BENIGN PROSTATIC HYPERPLASIA WITH LOWER URINARY TRACT SYMPTOMS, SYMPTOM DETAILS UNSPECIFIED: ICD-10-CM

## 2023-03-02 PROCEDURE — 99214 OFFICE O/P EST MOD 30 MIN: CPT | Performed by: NURSE PRACTITIONER

## 2023-03-02 RX ORDER — TESTOSTERONE ENANTHATE 100 MG/.5ML
100 INJECTION SUBCUTANEOUS
Qty: 12 ADJUSTABLE DOSE PRE-FILLED PEN SYRINGE | Refills: 1 | Status: SHIPPED | OUTPATIENT
Start: 2023-03-02 | End: 2023-03-02

## 2023-03-02 RX ORDER — TESTOSTERONE ENANTHATE 100 MG/.5ML
100 INJECTION SUBCUTANEOUS
Qty: 12 ADJUSTABLE DOSE PRE-FILLED PEN SYRINGE | Refills: 1 | Status: SHIPPED | OUTPATIENT
Start: 2023-03-02

## 2023-03-02 ASSESSMENT — ENCOUNTER SYMPTOMS
NAUSEA: 0
BACK PAIN: 0

## 2023-03-02 NOTE — PROGRESS NOTES
[address] [place] [address] [address]  [phone]          Alisia Hidalgo  : 1965    Chief Complaint   Patient presents with    Follow-up          HPI     Alisia Hidalgo is a 59 y.o. male  Returns today for follow-up on low testosterone. Patient has been using Xyosted 75mg every 7 days and is overall doing well. He does have days that he feels better than other days. He feels the injections however are much improved from the gel therapy. Testosterone level the day prior to injection therapy came back low normal at 353. Has significant history of heart disease and knows that this plays a role in his ED. He does use the Cialis periodically and has some success. He has had a couple of episodes however for the Cialis has not helped. He has a prescription for nitroglycerin but assures me he has never had to use it. He understands he cannot use nitroglycerin if he is taking Cialis.     Past Medical History:   Diagnosis Date    Arthritis     Chronic cough 10/2/2014    Coronary artery disease involving bypass graft of transplanted heart with unstable angina pectoris ([address] 2020    Family history of aortic aneurysm 10/2/2014    Glaucoma 2018    Hyperlipidemia     Mild intermittent asthma without complication 3787    Paroxysmal tachycardia ([address]     Post-nasal drip 10/2/2014    Psychiatric disorder     PTSD    Varicose veins of other specified sites      Past Surgical History:   Procedure Laterality Date    CARDIAC PROCEDURE N/A 2022    Left heart cath / coronary angiography performed by Marvin Avendaño MD at [facility] CATH LAB    TONSILLECTOMY       Current Outpatient Medications   Medication Sig Dispense Refill    Testosterone Enanthate (XYOSTED) 100 MG/0.5ML SOAJ Inject 100 mg into the skin every 7 days Max Daily Amount: 100 mg 12 Adjustable Dose Pre-filled Pen Syringe 1    Icosapent Ethyl (VASCEPA) 1 g CAPS capsule Take 2 capsules by mouth 2 times daily (with meals) 180 capsule 3    coenzyme Q10 100 MG CAPS capsule Take by mouth daily      Magnesium 400 MG TABS Take by mouth daily      ALPRAZolam (XANAX) 1 MG tablet Take 1 mg by mouth daily as needed. aspirin 81 MG chewable tablet Take 81 mg by mouth daily      Cholecalciferol (VITAMIN D) 10 MCG (400 UNIT) CAPS Capsule Take by mouth      fluticasone (FLONASE) 50 MCG/ACT nasal spray 2 sprays by Nasal route daily Prn      Menaquinone-7 (VITAMIN K2) 100 MCG CAPS Take by mouth daily      nitroGLYCERIN (NITROSTAT) 0.4 MG SL tablet Place 0.4 mg under the tongue      tadalafil (CIALIS) 20 MG tablet Take 20 mg by mouth as needed      amLODIPine (NORVASC) 5 MG tablet Take 5 mg by mouth daily (Patient not taking: Reported on 3/2/2023)      metoprolol succinate (TOPROL XL) 25 MG extended release tablet Take 12.5 mg by mouth daily (Patient not taking: No sig reported)      rosuvastatin (CRESTOR) 40 MG tablet Take 40 mg by mouth (Patient not taking: Reported on 3/2/2023)       No current facility-administered medications for this visit. Allergies   Allergen Reactions    Gadobutrol Hives and Itching     Pt. Broke out in hives,itching all over his body and throat feeling itchy and tight    Iodine Rash     Social History     Socioeconomic History    Marital status: Legally      Spouse name: Not on file    Number of children: Not on file    Years of education: Not on file    Highest education level: Not on file   Occupational History    Not on file   Tobacco Use    Smoking status: Never    Smokeless tobacco: Never   Substance and Sexual Activity    Alcohol use: Yes     Alcohol/week: 3.0 - 5.0 standard drinks    Drug use: No     Frequency: 14.0 times per week    Sexual activity: Not on file   Other Topics Concern    Not on file   Social History Narrative    Retired . He lives with his girlfriend and has a dog. He worked a  years ago and may have had asbestos exposure. Social Determinants of Health     Financial Resource Strain: Not on file   Food Insecurity: Not on file   Transportation Needs: Not on file   Physical Activity: Not on file   Stress: Not on file   Social Connections: Not on file   Intimate Partner Violence: Not on file   Housing Stability: Not on file     Family History   Problem Relation Age of Onset    Bleeding Prob Father        Review of Systems  Constitutional:   Negative for fever. GI:  Negative for nausea. Musculoskeletal:  Negative for back pain. Urinalysis  UA - Dipstick  Results for orders placed or performed in visit on 07/08/19   AMB POC URINALYSIS DIP STICK AUTO W/O MICRO   Result Value Ref Range    Color (UA POC) Yellow     Clarity (UA POC) Clear     Glucose, Urine, POC Negative Negative    Bilirubin, Urine, POC Negative Negative    Ketones, Urine, POC Negative Negative    Specific Gravity, Urine, POC 1.010 1.001 - 1.035 NA    Blood (UA POC) Negative Negative    pH, Urine, POC 7.0 4.6 - 8.0 NA    Protein, Urine, POC Negative Negative    Urobilinogen, POC 0.2 mg/dL 0.2 - 1    Nitrite, Urine, POC Negative Negative    Leukocyte Esterase, Urine, POC Negative Negative     PHYSICAL EXAM    GENERAL: No acute distress, Awake, Alert, Oriented X 3, Gait normal  ABDOMEN: soft, non tender, non-distended, positive bowel sounds, no organomegaly, no palpable masses, no guarding, no rebound tenderness  SKIN: No rash, no erythema, no lacerations or abrasions, no ecchymosis  MUSCULOSKELETAL - MAEW, no edema     Assessment and Plan    ICD-10-CM    1. Hypogonadism in male  E29.1 CBC     Hepatic Function Panel     Testosterone, free, total     PSA, Diagnostic     Testosterone Enanthate (XYOSTED) 100 MG/0.5ML SOAJ     DISCONTINUED: Testosterone Enanthate (XYOSTED) 100 MG/0.5ML SOAJ      2.  Benign prostatic hyperplasia with lower urinary tract symptoms, symptom details unspecified  N40.1 CBC     Hepatic Function Panel     Testosterone, free, total     PSA, Diagnostic     Testosterone Enanthate (XYOSTED) 100 MG/0.5ML SOAJ     DISCONTINUED: Testosterone Enanthate (XYOSTED) 100 MG/0.5ML SOAJ      3. Erectile dysfunction, unspecified erectile dysfunction type  N52.9 CBC     Hepatic Function Panel     Testosterone, free, total     PSA, Diagnostic     Testosterone Enanthate (XYOSTED) 100 MG/0.5ML SOAJ     DISCONTINUED: Testosterone Enanthate (XYOSTED) 100 MG/0.5ML SOAJ        BPH-  stable.  PSAs have been normal    Low testosterone-  Well controlled with Xyosted injection therapy.    ED-  Well controlled with Cialis.    PLAN:  Refill of meds as above will be sent in  Follow-up 1 year  Blood work prior to this appointment.    KARIN Mcintyre - HAYLEE King is supervising physician today and he approves plan of care.    Return in about 1 year (around 3/2/2024) for with blood work the week prior (CBC, PSA, T level and Hepatic panel).    Elements of this note have been dictated using speech recognition software.  Although reviewed, errors of speech recognition may have occurred.

## 2023-03-15 ENCOUNTER — PATIENT MESSAGE (OUTPATIENT)
Dept: UROLOGY | Age: 58
End: 2023-03-15

## 2023-03-15 DIAGNOSIS — N40.1 BENIGN PROSTATIC HYPERPLASIA WITH LOWER URINARY TRACT SYMPTOMS, SYMPTOM DETAILS UNSPECIFIED: ICD-10-CM

## 2023-03-15 DIAGNOSIS — E29.1 HYPOGONADISM IN MALE: ICD-10-CM

## 2023-03-15 DIAGNOSIS — N52.9 ERECTILE DYSFUNCTION, UNSPECIFIED ERECTILE DYSFUNCTION TYPE: ICD-10-CM

## 2023-03-16 RX ORDER — TESTOSTERONE ENANTHATE 100 MG/.5ML
100 INJECTION SUBCUTANEOUS
Qty: 12 ADJUSTABLE DOSE PRE-FILLED PEN SYRINGE | Refills: 1 | Status: SHIPPED | OUTPATIENT
Start: 2023-03-16

## 2023-03-16 NOTE — TELEPHONE ENCOUNTER
Message received from [employer]. The patient's prescription for Jil Porteous has been approved for one year through 03/16/2024. Patient notified.     Per the website:

## 2023-04-24 ENCOUNTER — TELEPHONE (OUTPATIENT)
Dept: CARDIOLOGY CLINIC | Age: 58
End: 2023-04-24

## 2023-04-24 DIAGNOSIS — E78.00 PURE HYPERCHOLESTEROLEMIA: Primary | ICD-10-CM

## 2023-04-24 NOTE — TELEPHONE ENCOUNTER
Received fax that pt needed prior auth for Vascepa. Called patient to schedule appt to see Dr. Tessy Souza and have labs drawn. Patient stated he would call us back to schedule appointment, stated he does not want us to send in PA at this time because he has gone off medications. Informed him I would place orders for fasting labs and he would need those prior to appointment. Voiced understanding.

## 2023-08-10 DIAGNOSIS — N40.1 BENIGN PROSTATIC HYPERPLASIA WITH LOWER URINARY TRACT SYMPTOMS, SYMPTOM DETAILS UNSPECIFIED: ICD-10-CM

## 2023-08-10 DIAGNOSIS — N52.9 ERECTILE DYSFUNCTION, UNSPECIFIED ERECTILE DYSFUNCTION TYPE: ICD-10-CM

## 2023-08-10 DIAGNOSIS — E29.1 HYPOGONADISM IN MALE: ICD-10-CM

## 2023-08-11 RX ORDER — TESTOSTERONE ENANTHATE 100 MG/.5ML
INJECTION SUBCUTANEOUS
Qty: 12 ADJUSTABLE DOSE PRE-FILLED PEN SYRINGE | Refills: 1 | Status: SHIPPED | OUTPATIENT
Start: 2023-08-11

## 2023-08-24 ENCOUNTER — HOSPITAL ENCOUNTER (EMERGENCY)
Age: 58
Discharge: HOME | End: 2023-08-24
Attending: EMERGENCY MEDICINE
Payer: COMMERCIAL

## 2023-08-24 ENCOUNTER — APPOINTMENT (OUTPATIENT)
Dept: CT IMAGING | Age: 58
End: 2023-08-24
Payer: COMMERCIAL

## 2023-08-24 VITALS
HEART RATE: 77 BPM | HEIGHT: 72 IN | RESPIRATION RATE: 14 BRPM | WEIGHT: 215 LBS | DIASTOLIC BLOOD PRESSURE: 92 MMHG | BODY MASS INDEX: 29.12 KG/M2 | SYSTOLIC BLOOD PRESSURE: 159 MMHG | TEMPERATURE: 97.7 F | OXYGEN SATURATION: 93 %

## 2023-08-24 DIAGNOSIS — E86.0 DEHYDRATION: ICD-10-CM

## 2023-08-24 DIAGNOSIS — H81.10 BENIGN PAROXYSMAL POSITIONAL VERTIGO, UNSPECIFIED LATERALITY: Primary | ICD-10-CM

## 2023-08-24 LAB
ALBUMIN SERPL-MCNC: 3.4 G/DL (ref 3.2–4.6)
ALBUMIN/GLOB SERPL: 0.8 (ref 0.4–1.6)
ALP SERPL-CCNC: 45 U/L (ref 50–136)
ALT SERPL-CCNC: 28 U/L (ref 12–65)
ANION GAP SERPL CALC-SCNC: 5 MMOL/L (ref 2–11)
AST SERPL-CCNC: 43 U/L (ref 15–37)
BASOPHILS # BLD: 0 K/UL (ref 0–0.2)
BASOPHILS NFR BLD: 0 % (ref 0–2)
BILIRUB SERPL-MCNC: 0.6 MG/DL (ref 0.2–1.1)
BUN SERPL-MCNC: 12 MG/DL (ref 8–23)
CALCIUM SERPL-MCNC: 9.3 MG/DL (ref 8.3–10.4)
CHLORIDE SERPL-SCNC: 106 MMOL/L (ref 101–110)
CO2 SERPL-SCNC: 25 MMOL/L (ref 21–32)
CREAT SERPL-MCNC: 1.1 MG/DL (ref 0.8–1.5)
DIFFERENTIAL METHOD BLD: ABNORMAL
EOSINOPHIL # BLD: 0.1 K/UL (ref 0–0.8)
EOSINOPHIL NFR BLD: 1 % (ref 0.5–7.8)
ERYTHROCYTE [DISTWIDTH] IN BLOOD BY AUTOMATED COUNT: 13.2 % (ref 11.9–14.6)
FLUAV RNA SPEC QL NAA+PROBE: NOT DETECTED
FLUBV RNA SPEC QL NAA+PROBE: NOT DETECTED
GLOBULIN SER CALC-MCNC: 4.3 G/DL (ref 2.8–4.5)
GLUCOSE SERPL-MCNC: 135 MG/DL (ref 65–100)
HCT VFR BLD AUTO: 51.4 % (ref 41.1–50.3)
HGB BLD-MCNC: 17.5 G/DL (ref 13.6–17.2)
IMM GRANULOCYTES # BLD AUTO: 0.1 K/UL (ref 0–0.5)
IMM GRANULOCYTES NFR BLD AUTO: 1 % (ref 0–5)
INR PPP: 1.1
LIPASE SERPL-CCNC: 66 U/L (ref 73–393)
LYMPHOCYTES # BLD: 1.1 K/UL (ref 0.5–4.6)
LYMPHOCYTES NFR BLD: 11 % (ref 13–44)
MCH RBC QN AUTO: 30.4 PG (ref 26.1–32.9)
MCHC RBC AUTO-ENTMCNC: 34 G/DL (ref 31.4–35)
MCV RBC AUTO: 89.4 FL (ref 82–102)
MONOCYTES # BLD: 0.7 K/UL (ref 0.1–1.3)
MONOCYTES NFR BLD: 6 % (ref 4–12)
NEUTS SEG # BLD: 8.7 K/UL (ref 1.7–8.2)
NEUTS SEG NFR BLD: 81 % (ref 43–78)
NRBC # BLD: 0 K/UL (ref 0–0.2)
PLATELET # BLD AUTO: 248 K/UL (ref 150–450)
PMV BLD AUTO: 9.5 FL (ref 9.4–12.3)
POTASSIUM SERPL-SCNC: 4.9 MMOL/L (ref 3.5–5.1)
PROT SERPL-MCNC: 7.7 G/DL (ref 6.3–8.2)
PROTHROMBIN TIME: 14.2 SEC (ref 12.6–14.3)
RBC # BLD AUTO: 5.75 M/UL (ref 4.23–5.6)
SARS-COV-2 RDRP RESP QL NAA+PROBE: NOT DETECTED
SODIUM SERPL-SCNC: 136 MMOL/L (ref 133–143)
SOURCE: NORMAL
TROPONIN I SERPL HS-MCNC: 4.4 PG/ML (ref 0–14)
WBC # BLD AUTO: 10.7 K/UL (ref 4.3–11.1)

## 2023-08-24 PROCEDURE — 70450 CT HEAD/BRAIN W/O DYE: CPT

## 2023-08-24 PROCEDURE — 96361 HYDRATE IV INFUSION ADD-ON: CPT

## 2023-08-24 PROCEDURE — 87635 SARS-COV-2 COVID-19 AMP PRB: CPT

## 2023-08-24 PROCEDURE — 83690 ASSAY OF LIPASE: CPT

## 2023-08-24 PROCEDURE — 85025 COMPLETE CBC W/AUTO DIFF WBC: CPT

## 2023-08-24 PROCEDURE — 99284 EMERGENCY DEPT VISIT MOD MDM: CPT

## 2023-08-24 PROCEDURE — 84484 ASSAY OF TROPONIN QUANT: CPT

## 2023-08-24 PROCEDURE — 2580000003 HC RX 258: Performed by: EMERGENCY MEDICINE

## 2023-08-24 PROCEDURE — 96375 TX/PRO/DX INJ NEW DRUG ADDON: CPT

## 2023-08-24 PROCEDURE — 87502 INFLUENZA DNA AMP PROBE: CPT

## 2023-08-24 PROCEDURE — 93005 ELECTROCARDIOGRAM TRACING: CPT | Performed by: EMERGENCY MEDICINE

## 2023-08-24 PROCEDURE — 96374 THER/PROPH/DIAG INJ IV PUSH: CPT

## 2023-08-24 PROCEDURE — 6360000002 HC RX W HCPCS: Performed by: EMERGENCY MEDICINE

## 2023-08-24 PROCEDURE — 80053 COMPREHEN METABOLIC PANEL: CPT

## 2023-08-24 PROCEDURE — 85610 PROTHROMBIN TIME: CPT

## 2023-08-24 RX ORDER — DIPHENHYDRAMINE HYDROCHLORIDE 50 MG/ML
25 INJECTION INTRAMUSCULAR; INTRAVENOUS
Status: COMPLETED | OUTPATIENT
Start: 2023-08-24 | End: 2023-08-24

## 2023-08-24 RX ORDER — 0.9 % SODIUM CHLORIDE 0.9 %
1000 INTRAVENOUS SOLUTION INTRAVENOUS
Status: COMPLETED | OUTPATIENT
Start: 2023-08-24 | End: 2023-08-24

## 2023-08-24 RX ORDER — PROCHLORPERAZINE EDISYLATE 5 MG/ML
10 INJECTION INTRAMUSCULAR; INTRAVENOUS
Status: COMPLETED | OUTPATIENT
Start: 2023-08-24 | End: 2023-08-24

## 2023-08-24 RX ORDER — LORAZEPAM 2 MG/ML
1 INJECTION INTRAMUSCULAR ONCE
Status: COMPLETED | OUTPATIENT
Start: 2023-08-24 | End: 2023-08-24

## 2023-08-24 RX ORDER — MECLIZINE HYDROCHLORIDE 25 MG/1
25 TABLET ORAL 3 TIMES DAILY PRN
Qty: 20 TABLET | Refills: 0 | Status: SHIPPED | OUTPATIENT
Start: 2023-08-24 | End: 2023-08-25 | Stop reason: SDUPTHER

## 2023-08-24 RX ORDER — DROPERIDOL 2.5 MG/ML
1.25 INJECTION, SOLUTION INTRAMUSCULAR; INTRAVENOUS ONCE
Status: COMPLETED | OUTPATIENT
Start: 2023-08-24 | End: 2023-08-24

## 2023-08-24 RX ADMIN — SODIUM CHLORIDE 1000 ML: 9 INJECTION, SOLUTION INTRAVENOUS at 19:49

## 2023-08-24 RX ADMIN — DIPHENHYDRAMINE HYDROCHLORIDE 25 MG: 50 INJECTION, SOLUTION INTRAMUSCULAR; INTRAVENOUS at 20:47

## 2023-08-24 RX ADMIN — PROCHLORPERAZINE EDISYLATE 10 MG: 5 INJECTION INTRAMUSCULAR; INTRAVENOUS at 18:14

## 2023-08-24 RX ADMIN — LORAZEPAM 1 MG: 2 INJECTION INTRAMUSCULAR; INTRAVENOUS at 18:13

## 2023-08-24 RX ADMIN — DROPERIDOL 1.25 MG: 2.5 INJECTION, SOLUTION INTRAMUSCULAR; INTRAVENOUS at 20:47

## 2023-08-24 RX ADMIN — SODIUM CHLORIDE 1000 ML: 9 INJECTION, SOLUTION INTRAVENOUS at 18:14

## 2023-08-24 ASSESSMENT — ENCOUNTER SYMPTOMS
BACK PAIN: 0
EYE REDNESS: 0
PHOTOPHOBIA: 0
VOMITING: 1
CHEST TIGHTNESS: 0
ABDOMINAL PAIN: 0
CHOKING: 0
COLOR CHANGE: 0
SHORTNESS OF BREATH: 0
NAUSEA: 1

## 2023-08-24 ASSESSMENT — LIFESTYLE VARIABLES
HOW OFTEN DO YOU HAVE A DRINK CONTAINING ALCOHOL: 2-3 TIMES A WEEK
HOW MANY STANDARD DRINKS CONTAINING ALCOHOL DO YOU HAVE ON A TYPICAL DAY: 1 OR 2

## 2023-08-24 ASSESSMENT — PAIN - FUNCTIONAL ASSESSMENT: PAIN_FUNCTIONAL_ASSESSMENT: NONE - DENIES PAIN

## 2023-08-24 NOTE — ED PROVIDER NOTES
Smoking status: Never    Smokeless tobacco: Never   Substance and Sexual Activity    Alcohol use: Yes     Alcohol/week: 3.0 - 5.0 standard drinks    Drug use: No     Frequency: 14.0 times per week   Social History Narrative    Retired . He lives with his girlfriend and has a dog. He worked a  years ago and may have had asbestos exposure. Previous Medications    ALPRAZOLAM (XANAX) 1 MG TABLET    Take 1 mg by mouth daily as needed.     AMLODIPINE (NORVASC) 5 MG TABLET    Take 5 mg by mouth daily    ASPIRIN 81 MG CHEWABLE TABLET    Take 81 mg by mouth daily    CHOLECALCIFEROL (VITAMIN D) 10 MCG (400 UNIT) CAPS CAPSULE    Take by mouth    COENZYME Q10 100 MG CAPS CAPSULE    Take by mouth daily    FLUTICASONE (FLONASE) 50 MCG/ACT NASAL SPRAY    2 sprays by Nasal route daily Prn    ICOSAPENT ETHYL (VASCEPA) 1 G CAPS CAPSULE    Take 2 capsules by mouth 2 times daily (with meals)    MAGNESIUM 400 MG TABS    Take by mouth daily    MENAQUINONE-7 (VITAMIN K2) 100 MCG CAPS    Take by mouth daily    METOPROLOL SUCCINATE (TOPROL XL) 25 MG EXTENDED RELEASE TABLET    Take 12.5 mg by mouth daily    NITROGLYCERIN (NITROSTAT) 0.4 MG SL TABLET    Place 0.4 mg under the tongue    ROSUVASTATIN (CRESTOR) 40 MG TABLET    Take 40 mg by mouth    TADALAFIL (CIALIS) 20 MG TABLET    Take 20 mg by mouth as needed    XYOSTED 100 MG/0.5ML SOAJ    INJECT SUBCUTANEOUSLY 0.5ML  (100MG) (ABDOMINAL REGION ONLY)  EVERY 7 DAYS        Results for orders placed or performed during the hospital encounter of 08/24/23   COVID-19, Rapid    Specimen: Nasopharyngeal   Result Value Ref Range    Source NASAL      SARS-CoV-2, Rapid Not detected NOTD     Influenza A/B, Molecular    Specimen: Not Specified   Result Value Ref Range    Influenza A, SHABANA Not detected NOTD      Influenza B, SHABANA Not detected NOTD     CT HEAD WO CONTRAST    Narrative    EXAMINATION: CT HEAD WITHOUT CONTRAST    DATE: 8/24/2023 6:00 PM    INDICATION:

## 2023-08-24 NOTE — ED TRIAGE NOTES
Pt brought in by EMS c/o emesis (x 1 hr) and intermittent vertigo (pt describes the room as \"spinning\") (since Tuesday). (-) SOB (-) chest pain. Pt reports some L sided abd pain this am that has since resolved. Pt given 8 mg of zofran and 600ml of NS en route per EMS. Pt sinus tach and HTN en route. Hx of stent placement (Dec 2022) A&O 4/4, GCS 15, Speech clear. Pt resting quietly in bed, attached to cardiac monitor.

## 2023-08-25 ENCOUNTER — OFFICE VISIT (OUTPATIENT)
Dept: INTERNAL MEDICINE CLINIC | Facility: CLINIC | Age: 58
End: 2023-08-25
Payer: COMMERCIAL

## 2023-08-25 VITALS
SYSTOLIC BLOOD PRESSURE: 160 MMHG | HEIGHT: 72 IN | BODY MASS INDEX: 28.71 KG/M2 | OXYGEN SATURATION: 95 % | HEART RATE: 63 BPM | WEIGHT: 212 LBS | DIASTOLIC BLOOD PRESSURE: 96 MMHG

## 2023-08-25 DIAGNOSIS — R42 VERTIGO: Primary | ICD-10-CM

## 2023-08-25 DIAGNOSIS — Z13.29 SCREENING FOR THYROID DISORDER: ICD-10-CM

## 2023-08-25 DIAGNOSIS — Z13.1 SCREENING FOR DIABETES MELLITUS (DM): ICD-10-CM

## 2023-08-25 DIAGNOSIS — E55.9 VITAMIN D DEFICIENCY: ICD-10-CM

## 2023-08-25 DIAGNOSIS — E78.00 PURE HYPERCHOLESTEROLEMIA: ICD-10-CM

## 2023-08-25 DIAGNOSIS — E86.0 DEHYDRATION: ICD-10-CM

## 2023-08-25 DIAGNOSIS — I10 ESSENTIAL HYPERTENSION WITH GOAL BLOOD PRESSURE LESS THAN 130/85: ICD-10-CM

## 2023-08-25 DIAGNOSIS — R93.1 ELEVATED CORONARY ARTERY CALCIUM SCORE: ICD-10-CM

## 2023-08-25 DIAGNOSIS — Z11.59 NEED FOR HEPATITIS C SCREENING TEST: ICD-10-CM

## 2023-08-25 LAB
EKG ATRIAL RATE: 86 BPM
EKG DIAGNOSIS: NORMAL
EKG P AXIS: 63 DEGREES
EKG P-R INTERVAL: 205 MS
EKG Q-T INTERVAL: 373 MS
EKG QRS DURATION: 90 MS
EKG QTC CALCULATION (BAZETT): 444 MS
EKG R AXIS: 45 DEGREES
EKG T AXIS: 67 DEGREES
EKG VENTRICULAR RATE: 86 BPM

## 2023-08-25 PROCEDURE — 99214 OFFICE O/P EST MOD 30 MIN: CPT | Performed by: FAMILY MEDICINE

## 2023-08-25 PROCEDURE — 3080F DIAST BP >= 90 MM HG: CPT | Performed by: FAMILY MEDICINE

## 2023-08-25 PROCEDURE — 93010 ELECTROCARDIOGRAM REPORT: CPT | Performed by: INTERNAL MEDICINE

## 2023-08-25 PROCEDURE — 3077F SYST BP >= 140 MM HG: CPT | Performed by: FAMILY MEDICINE

## 2023-08-25 RX ORDER — MECLIZINE HYDROCHLORIDE 25 MG/1
25 TABLET ORAL 3 TIMES DAILY PRN
Qty: 30 TABLET | Refills: 1 | Status: SHIPPED | OUTPATIENT
Start: 2023-08-25 | End: 2023-09-14

## 2023-08-25 RX ORDER — ONDANSETRON 4 MG/1
4 TABLET, FILM COATED ORAL DAILY PRN
Qty: 30 TABLET | Refills: 0 | Status: SHIPPED | OUTPATIENT
Start: 2023-08-25

## 2023-08-25 ASSESSMENT — ENCOUNTER SYMPTOMS
BLOOD IN STOOL: 0
SHORTNESS OF BREATH: 0
ABDOMINAL PAIN: 0

## 2023-08-25 NOTE — PROGRESS NOTES
8/25/2023     Subjective:     Chief Complaint   Patient presents with    Follow-up     ER f/u, seen yesterday for vertigo. States he feels better than he did last night. HPI:     Tuesday night woke up dizzy and felt bed was spinning, went back to bed. Wednesday middle of day had some dizziness. Yesterday went to gym developed severe dizziness and had stop work out. Started to throw up and called 911. Couldn't get up to walk, was vomiting for about 4 hrs. Got zofran in the ambulance. ED follow up  Dx: vertigo, dehydration - CT head, EKG normal; received IVF, meclizine for home    Today still with some dizziness. He has not started his meclizine. He would like a prescription sent to the pharmacy. HTN  Blood pressure has been well controlled until yesterday when needed to go to the emergency room. Still elevated today. He checks at home. Interim History: See above     Review of Systems   Constitutional:  Negative for fatigue. HENT:  Negative for congestion. Respiratory:  Negative for shortness of breath. Cardiovascular:  Negative for chest pain. Gastrointestinal:  Negative for abdominal pain and blood in stool. Genitourinary:  Negative for difficulty urinating. Skin:  Negative for rash. Neurological:  Positive for dizziness. Negative for headaches.        Past Medical History:   Diagnosis Date    Arthritis     Chronic cough 10/2/2014    Coronary artery disease involving bypass graft of transplanted heart with unstable angina pectoris ([address]) 12/24/2020    Family history of aortic aneurysm 10/2/2014    Glaucoma 2018    Hyperlipidemia     Mild intermittent asthma without complication 9/18/0968    Paroxysmal tachycardia ([address])     Post-nasal drip 10/2/2014    Psychiatric disorder     PTSD    Varicose veins of other specified sites      Past Surgical History:   Procedure Laterality Date    CARDIAC PROCEDURE N/A 09/09/2022    Left heart cath / coronary angiography performed by Summer Rahman

## 2023-08-25 NOTE — DISCHARGE INSTRUCTIONS
Take medications as prescribed  Limit any quick changes of direction to include standing and turning head left or right  Follow-up with your primary care physician  Drink plenty of fluids  Return to the ER for any new, worsening or life-threatening symptoms

## 2023-08-28 ENCOUNTER — TELEPHONE (OUTPATIENT)
Dept: INTERNAL MEDICINE CLINIC | Facility: CLINIC | Age: 58
End: 2023-08-28

## 2023-09-19 ENCOUNTER — TELEPHONE (OUTPATIENT)
Dept: INTERNAL MEDICINE CLINIC | Facility: CLINIC | Age: 58
End: 2023-09-19

## 2023-09-19 NOTE — TELEPHONE ENCOUNTER
----- Message from Camila Aguilar sent at 9/19/2023  2:12 PM EDT -----  Subject: Referral Request    Reason for referral request? Hemoglobin A1C test  Provider patient wants to be referred to(if known):     Provider Phone Number(if known): Additional Information for Provider? Pt of Rola Gee called to request   Hemoglobin A1C test. Stated ENT referred pt to PCP for further testing.   ---------------------------------------------------------------------------  --------------  [address]    [phone];  Do not leave any message, patient will call back for answer  ---------------------------------------------------------------------------  --------------

## 2023-09-20 ENCOUNTER — TELEMEDICINE (OUTPATIENT)
Dept: INTERNAL MEDICINE CLINIC | Facility: CLINIC | Age: 58
End: 2023-09-20
Payer: COMMERCIAL

## 2023-09-20 DIAGNOSIS — R42 VERTIGO: Primary | ICD-10-CM

## 2023-09-20 DIAGNOSIS — R73.9 ELEVATED BLOOD SUGAR: ICD-10-CM

## 2023-09-20 PROCEDURE — 99213 OFFICE O/P EST LOW 20 MIN: CPT | Performed by: FAMILY MEDICINE

## 2023-09-20 RX ORDER — MECLIZINE HYDROCHLORIDE 25 MG/1
TABLET ORAL
COMMUNITY
Start: 2023-08-25

## 2023-09-20 ASSESSMENT — ENCOUNTER SYMPTOMS
SHORTNESS OF BREATH: 0
BLOOD IN STOOL: 0
ABDOMINAL PAIN: 0

## 2023-09-20 NOTE — PROGRESS NOTES
[] No Facial Asymmetry (Cranial nerve 7 motor function) (limited exam to video visit)          [] No gaze palsy        [] Abnormal-         Skin:        [] No significant exanthematous lesions or discoloration noted on facial skin         [] Abnormal-            Psychiatric:       [] Normal Affect [] No Hallucinations        [] Abnormal-     Other pertinent observable physical exam findings-     ASSESSMENT/PLAN:   Diagnosis Orders   1. Vertigo        2. Elevated blood sugar          Vertigo. Reviewed ENT evaluation. Patient refuses to have vestibular therapy. His last CT scan was normal. HE still has some mild vertigo. Elevated blood sugar at ER visit. He has had a history of prediabetes and his A1c has always been 5.8 range. He is scheduled to have routine labs. Mild move his planned physical from January 2024 to sometime in October with labs prior. Opportunity to ask questions was offered and were answered to the best of my ability. Call, send Codesiont message or RTC if with questions or concerns      Return for reschedule his CPE with labs prior to October . Sallie Garza, was evaluated through a synchronous (real-time) audio-video encounter. The patient (or guardian if applicable) is aware that this is a billable service, which includes applicable co-pays. This Virtual Visit was conducted with patient's (and/or legal guardian's) consent. Patient identification was verified, and a caregiver was present when appropriate. The patient was located at Home: [address] [place] [address] [address]  Provider was located at [facility] ([address]): [facility],  [address] [place] [address]        Total time spent on this encounter: Not billed by time    --Joon Long MD on 9/20/2023 at 2:17 PM    An electronic signature was used to authenticate this note.
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
## 2024-01-17 DIAGNOSIS — N40.1 BENIGN PROSTATIC HYPERPLASIA WITH LOWER URINARY TRACT SYMPTOMS, SYMPTOM DETAILS UNSPECIFIED: ICD-10-CM

## 2024-01-17 DIAGNOSIS — N52.9 ERECTILE DYSFUNCTION, UNSPECIFIED ERECTILE DYSFUNCTION TYPE: ICD-10-CM

## 2024-01-17 DIAGNOSIS — Z13.29 SCREENING FOR THYROID DISORDER: ICD-10-CM

## 2024-01-17 DIAGNOSIS — E55.9 VITAMIN D DEFICIENCY: ICD-10-CM

## 2024-01-17 DIAGNOSIS — E29.1 HYPOGONADISM IN MALE: ICD-10-CM

## 2024-01-17 DIAGNOSIS — E78.00 PURE HYPERCHOLESTEROLEMIA: ICD-10-CM

## 2024-01-17 DIAGNOSIS — R93.1 ELEVATED CORONARY ARTERY CALCIUM SCORE: ICD-10-CM

## 2024-01-17 DIAGNOSIS — Z11.59 NEED FOR HEPATITIS C SCREENING TEST: ICD-10-CM

## 2024-01-17 DIAGNOSIS — Z13.1 SCREENING FOR DIABETES MELLITUS (DM): ICD-10-CM

## 2024-01-17 LAB
25(OH)D3 SERPL-MCNC: 46.6 NG/ML (ref 30–100)
ALBUMIN SERPL-MCNC: 4.1 G/DL (ref 3.2–4.6)
ALBUMIN SERPL-MCNC: 4.2 G/DL (ref 3.2–4.6)
ALBUMIN/GLOB SERPL: 1.3 (ref 0.4–1.6)
ALBUMIN/GLOB SERPL: 1.3 (ref 0.4–1.6)
ALP SERPL-CCNC: 51 U/L (ref 50–136)
ALP SERPL-CCNC: 52 U/L (ref 50–136)
ALT SERPL-CCNC: 31 U/L (ref 12–65)
ALT SERPL-CCNC: 33 U/L (ref 12–65)
ANION GAP SERPL CALC-SCNC: 4 MMOL/L (ref 2–11)
AST SERPL-CCNC: 25 U/L (ref 15–37)
AST SERPL-CCNC: 35 U/L (ref 15–37)
BILIRUB DIRECT SERPL-MCNC: 0.2 MG/DL
BILIRUB SERPL-MCNC: 0.7 MG/DL (ref 0.2–1.1)
BILIRUB SERPL-MCNC: 0.7 MG/DL (ref 0.2–1.1)
BUN SERPL-MCNC: 10 MG/DL (ref 8–23)
CALCIUM SERPL-MCNC: 10 MG/DL (ref 8.3–10.4)
CHLORIDE SERPL-SCNC: 100 MMOL/L (ref 103–113)
CHOLEST SERPL-MCNC: 375 MG/DL
CO2 SERPL-SCNC: 30 MMOL/L (ref 21–32)
CREAT SERPL-MCNC: 1.1 MG/DL (ref 0.8–1.5)
ERYTHROCYTE [DISTWIDTH] IN BLOOD BY AUTOMATED COUNT: 13.2 % (ref 11.9–14.6)
EST. AVERAGE GLUCOSE BLD GHB EST-MCNC: 111 MG/DL
GLOBULIN SER CALC-MCNC: 3.2 G/DL (ref 2.8–4.5)
GLOBULIN SER CALC-MCNC: 3.2 G/DL (ref 2.8–4.5)
GLUCOSE SERPL-MCNC: 104 MG/DL (ref 65–100)
HBA1C MFR BLD: 5.5 % (ref 4.8–5.6)
HCT VFR BLD AUTO: 52 % (ref 41.1–50.3)
HCV AB SER QL: NONREACTIVE
HDLC SERPL-MCNC: 39 MG/DL (ref 40–60)
HDLC SERPL: 9.6
HGB BLD-MCNC: 17.5 G/DL (ref 13.6–17.2)
LDLC SERPL CALC-MCNC: ABNORMAL MG/DL
MCH RBC QN AUTO: 31.3 PG (ref 26.1–32.9)
MCHC RBC AUTO-ENTMCNC: 33.7 G/DL (ref 31.4–35)
MCV RBC AUTO: 92.9 FL (ref 82–102)
NRBC # BLD: 0 K/UL (ref 0–0.2)
PLATELET # BLD AUTO: 238 K/UL (ref 150–450)
PMV BLD AUTO: 9.2 FL (ref 9.4–12.3)
POTASSIUM SERPL-SCNC: 4.3 MMOL/L (ref 3.5–5.1)
PROT SERPL-MCNC: 7.3 G/DL (ref 6.3–8.2)
PROT SERPL-MCNC: 7.4 G/DL (ref 6.3–8.2)
PSA SERPL-MCNC: 3.7 NG/ML
RBC # BLD AUTO: 5.6 M/UL (ref 4.23–5.6)
SODIUM SERPL-SCNC: 134 MMOL/L (ref 136–146)
TRIGL SERPL-MCNC: 455 MG/DL (ref 35–150)
TSH, 3RD GENERATION: 1.77 UIU/ML (ref 0.36–3.74)
VLDLC SERPL CALC-MCNC: 91 MG/DL (ref 6–23)
WBC # BLD AUTO: 4.8 K/UL (ref 4.3–11.1)

## 2024-01-18 LAB — LDLC SERPL DIRECT ASSAY-MCNC: 202 MG/DL

## 2024-01-24 ENCOUNTER — OFFICE VISIT (OUTPATIENT)
Dept: INTERNAL MEDICINE CLINIC | Facility: CLINIC | Age: 59
End: 2024-01-24
Payer: COMMERCIAL

## 2024-01-24 VITALS
SYSTOLIC BLOOD PRESSURE: 149 MMHG | WEIGHT: 210 LBS | OXYGEN SATURATION: 100 % | BODY MASS INDEX: 28.44 KG/M2 | HEIGHT: 72 IN | HEART RATE: 75 BPM | DIASTOLIC BLOOD PRESSURE: 81 MMHG

## 2024-01-24 DIAGNOSIS — E55.9 VITAMIN D DEFICIENCY: ICD-10-CM

## 2024-01-24 DIAGNOSIS — E78.00 PURE HYPERCHOLESTEROLEMIA: ICD-10-CM

## 2024-01-24 DIAGNOSIS — R73.01 IMPAIRED FASTING BLOOD SUGAR: ICD-10-CM

## 2024-01-24 DIAGNOSIS — I25.110 CORONARY ARTERY DISEASE INVOLVING NATIVE CORONARY ARTERY OF NATIVE HEART WITH UNSTABLE ANGINA PECTORIS (HCC): ICD-10-CM

## 2024-01-24 DIAGNOSIS — Z00.00 ROUTINE GENERAL MEDICAL EXAMINATION AT A HEALTH CARE FACILITY: Primary | ICD-10-CM

## 2024-01-24 DIAGNOSIS — I10 ESSENTIAL HYPERTENSION WITH GOAL BLOOD PRESSURE LESS THAN 130/85: ICD-10-CM

## 2024-01-24 DIAGNOSIS — Z12.11 SCREENING FOR COLON CANCER: ICD-10-CM

## 2024-01-24 PROCEDURE — 99396 PREV VISIT EST AGE 40-64: CPT | Performed by: FAMILY MEDICINE

## 2024-01-24 PROCEDURE — 3077F SYST BP >= 140 MM HG: CPT | Performed by: FAMILY MEDICINE

## 2024-01-24 PROCEDURE — 3079F DIAST BP 80-89 MM HG: CPT | Performed by: FAMILY MEDICINE

## 2024-01-24 RX ORDER — ROSUVASTATIN CALCIUM 40 MG/1
40 TABLET, COATED ORAL DAILY
Qty: 90 TABLET | Refills: 3 | Status: SHIPPED | OUTPATIENT
Start: 2024-01-24

## 2024-01-24 ASSESSMENT — ENCOUNTER SYMPTOMS
CONSTIPATION: 0
CHEST TIGHTNESS: 0
ABDOMINAL PAIN: 0
BLOOD IN STOOL: 0
DIARRHEA: 0
COUGH: 0
SHORTNESS OF BREATH: 0
TROUBLE SWALLOWING: 0
SORE THROAT: 0

## 2024-01-26 LAB
TESTOST FREE SERPL-MCNC: 11.3 PG/ML (ref 6.6–18.1)
TESTOST SERPL-MCNC: 414 NG/DL (ref 264–916)

## 2024-02-25 LAB — NONINV COLON CA DNA+OCC BLD SCRN STL QL: NEGATIVE

## 2024-03-05 ENCOUNTER — OFFICE VISIT (OUTPATIENT)
Dept: UROLOGY | Age: 59
End: 2024-03-05
Payer: COMMERCIAL

## 2024-03-05 DIAGNOSIS — E29.1 HYPOGONADISM IN MALE: Primary | ICD-10-CM

## 2024-03-05 DIAGNOSIS — N52.9 ERECTILE DYSFUNCTION, UNSPECIFIED ERECTILE DYSFUNCTION TYPE: ICD-10-CM

## 2024-03-05 DIAGNOSIS — N40.1 BENIGN PROSTATIC HYPERPLASIA WITH LOWER URINARY TRACT SYMPTOMS, SYMPTOM DETAILS UNSPECIFIED: ICD-10-CM

## 2024-03-05 LAB
BILIRUBIN, URINE, POC: NEGATIVE
BLOOD URINE, POC: NEGATIVE
GLUCOSE URINE, POC: NEGATIVE
KETONES, URINE, POC: NEGATIVE
LEUKOCYTE ESTERASE, URINE, POC: NEGATIVE
NITRITE, URINE, POC: NEGATIVE
PH, URINE, POC: 7 (ref 4.6–8)
PROTEIN,URINE, POC: NEGATIVE
SPECIFIC GRAVITY, URINE, POC: 1.01 (ref 1–1.03)
URINALYSIS CLARITY, POC: NORMAL
URINALYSIS COLOR, POC: NORMAL
UROBILINOGEN, POC: NORMAL

## 2024-03-05 PROCEDURE — 81003 URINALYSIS AUTO W/O SCOPE: CPT | Performed by: NURSE PRACTITIONER

## 2024-03-05 PROCEDURE — 99214 OFFICE O/P EST MOD 30 MIN: CPT | Performed by: NURSE PRACTITIONER

## 2024-03-05 RX ORDER — NEEDLES, DISPOSABLE 25GX5/8"
NEEDLE, DISPOSABLE MISCELLANEOUS
Qty: 100 EACH | Refills: 5 | Status: SHIPPED | OUTPATIENT
Start: 2024-03-05

## 2024-03-05 RX ORDER — NEEDLES, SAFETY 18GX1"
NEEDLE, DISPOSABLE MISCELLANEOUS
Qty: 100 EACH | Refills: 5 | Status: SHIPPED | OUTPATIENT
Start: 2024-03-05

## 2024-03-05 RX ORDER — TADALAFIL 20 MG/1
20 TABLET ORAL PRN
Qty: 30 TABLET | Refills: 5 | Status: SHIPPED | OUTPATIENT
Start: 2024-03-05

## 2024-03-05 RX ORDER — TESTOSTERONE CYPIONATE 200 MG/ML
200 VIAL (ML) INTRAMUSCULAR
Qty: 6 ML | Refills: 1 | Status: SHIPPED | OUTPATIENT
Start: 2024-03-05

## 2024-03-05 ASSESSMENT — ENCOUNTER SYMPTOMS
NAUSEA: 0
BACK PAIN: 0

## 2024-03-05 NOTE — PROGRESS NOTES
EXAM    GENERAL: No acute distress, Awake, Alert, Oriented X 3, Gait normal  ABDOMEN: soft, non tender, non-distended, positive bowel sounds, no organomegaly, no palpable masses, no guarding, no rebound tenderness  SKIN: No rash, no erythema, no lacerations or abrasions, no ecchymosis  MUSCULOSKELETAL - MAEW, no edema     Assessment and Plan    ICD-10-CM    1. Hypogonadism in male  E29.1 Testosterone Cypionate 200 MG/ML SOLN      2. Erectile dysfunction, unspecified erectile dysfunction type  N52.9 AMB POC URINALYSIS DIP STICK AUTO W/O MICRO      3. Benign prostatic hyperplasia with lower urinary tract symptoms, symptom details unspecified  N40.1         Hypergonadism  -has been well-controlled with Xyosted 75 mg IM every 7 days.  We will need to switch the medication over to intramuscular.  We will do 200 mg of testosterone cypionate every 14 days.  Prescription resent to pharmacy.  We can recheck blood in 6 months.  We will also repeat PSA at that time.    ED  -overall has been controlled well with Cialis.  He knows he needs to take it about 24 hours before use for him to get good efficacy.  Refills of this will also be sent to pharmacy.      Orders Placed This Encounter    AMB POC URINALYSIS DIP STICK AUTO W/O MICRO    Syringe/Needle, Disp, (EASYPOINT NEEDLE/SYRINGE) 18G X 1\" 3 ML MISC     Sig: Use as directed     Dispense:  100 each     Refill:  5    NEEDLE, DISP, 21 G (B-D HYPODERMIC NEEDLE 21GX1\") 21G X 1\" MISC     Sig: Use as directed     Dispense:  100 each     Refill:  5    Testosterone Cypionate 200 MG/ML SOLN     Sig: Inject 200 mg as directed every 14 days Max Daily Amount: 200 mg     Dispense:  6 mL     Refill:  1    tadalafil (CIALIS) 20 MG tablet     Sig: Take 1 tablet by mouth as needed for Erectile Dysfunction PRN     Dispense:  30 tablet     Refill:  5       Return in about 6 months (around 9/5/2024) for bw only ( t level and PSA) OV with me in 1 year.    Total time for today's encounter including

## 2024-03-06 ENCOUNTER — PATIENT MESSAGE (OUTPATIENT)
Dept: UROLOGY | Age: 59
End: 2024-03-06

## 2024-03-07 NOTE — TELEPHONE ENCOUNTER
From: Richard Alfred Merlino  To: Sabrina Madden  Sent: 3/6/2024 6:29 PM EST  Subject: Authorization     Chilango Bennett this is Richard Merlino, was in on Tuesday the 4th and and we had to change from the xyosted as they would not cover it. [employer] emailed me today that they need you to contact them for the authorization for the new medication and have it on hold until then. If your office could possibly contact them for me, God bless you guys for dealing with these insurance companies!!!!  Thanks, Rich

## 2024-03-13 ENCOUNTER — TELEPHONE (OUTPATIENT)
Dept: UROLOGY | Age: 59
End: 2024-03-13

## 2024-03-13 NOTE — TELEPHONE ENCOUNTER
Regarding: Authorization   Contact: [phone]  ----- Message from Jaye Young MA sent at 3/7/2024  8:02 AM EST -----       ----- Message from Merlino, Richard Alfred \"Rich\" to Sabrina Madden APRN - NP sent at 3/6/2024  6:29 PM -----   Chilango Bennett this is Richard Merlino, was in on Tuesday the 4th and and we had to change from the xyosted as they would not cover it. [employer] emailed me today that they need you to contact them for the authorization for the new medication and have it on hold until then. If your office could possibly contact them for me, God bless you guys for dealing with these insurance companies!!!!  Thanks, Rich

## 2024-03-13 NOTE — TELEPHONE ENCOUNTER
Attempted to initiate with [employer] via Cover My Meds, as was done in previous years but was redirected to a different PDP.    Per the website:

## 2024-03-13 NOTE — TELEPHONE ENCOUNTER
Called the phone number for [facility] [employer] @ [phone]. There is an automated system stating requests for prior authorization must be done via e-mail, or sent by fax to [phone].    OV notes, blood work, medication history and copy of prescription sent, along with a letter of medical necessity requesting prior authorization of testosterone cypionate.

## 2024-07-24 ENCOUNTER — TELEPHONE (OUTPATIENT)
Dept: INTERNAL MEDICINE CLINIC | Facility: CLINIC | Age: 59
End: 2024-07-24

## 2024-07-24 NOTE — TELEPHONE ENCOUNTER
Please schedule new to provider apt for this patient or advise to see different pcp if no longer wants to be established in this office. Thank you.

## 2024-08-21 ENCOUNTER — PATIENT MESSAGE (OUTPATIENT)
Dept: UROLOGY | Age: 59
End: 2024-08-21

## 2024-08-23 ENCOUNTER — TELEPHONE (OUTPATIENT)
Dept: UROLOGY | Age: 59
End: 2024-08-23

## 2024-08-23 NOTE — TELEPHONE ENCOUNTER
Pt LVM stating that he has an BD scheduled for 09/05/2024.  He will not be in the area and asked if he could come today (08/23/2024) or Monday (08/26/2024). Called pt back, but got no answer.

## 2024-08-27 DIAGNOSIS — E29.1 HYPOGONADISM IN MALE: Primary | ICD-10-CM

## 2024-08-27 DIAGNOSIS — E55.9 VITAMIN D DEFICIENCY: ICD-10-CM

## 2024-08-27 DIAGNOSIS — R73.01 IMPAIRED FASTING BLOOD SUGAR: ICD-10-CM

## 2024-08-27 DIAGNOSIS — E29.1 HYPOGONADISM IN MALE: ICD-10-CM

## 2024-08-27 DIAGNOSIS — E78.00 PURE HYPERCHOLESTEROLEMIA: ICD-10-CM

## 2024-08-27 LAB
25(OH)D3 SERPL-MCNC: 34 NG/ML (ref 30–100)
ALBUMIN SERPL-MCNC: 3.9 G/DL (ref 3.2–4.6)
ALBUMIN/GLOB SERPL: 1.2 (ref 1–1.9)
ALP SERPL-CCNC: 59 U/L (ref 40–129)
ALT SERPL-CCNC: 47 U/L (ref 12–65)
ANION GAP SERPL CALC-SCNC: 9 MMOL/L (ref 9–18)
AST SERPL-CCNC: 47 U/L (ref 15–37)
BILIRUB SERPL-MCNC: 0.6 MG/DL (ref 0–1.2)
BUN SERPL-MCNC: 15 MG/DL (ref 8–23)
CALCIUM SERPL-MCNC: 10 MG/DL (ref 8.8–10.2)
CHLORIDE SERPL-SCNC: 99 MMOL/L (ref 98–107)
CHOLEST SERPL-MCNC: 243 MG/DL (ref 0–200)
CO2 SERPL-SCNC: 28 MMOL/L (ref 20–28)
CREAT SERPL-MCNC: 0.84 MG/DL (ref 0.8–1.3)
ERYTHROCYTE [DISTWIDTH] IN BLOOD BY AUTOMATED COUNT: 12.3 % (ref 11.9–14.6)
EST. AVERAGE GLUCOSE BLD GHB EST-MCNC: 117 MG/DL
GLOBULIN SER CALC-MCNC: 3.2 G/DL (ref 2.3–3.5)
GLUCOSE SERPL-MCNC: 94 MG/DL (ref 70–99)
HBA1C MFR BLD: 5.7 % (ref 0–5.6)
HCT VFR BLD AUTO: 46.3 % (ref 41.1–50.3)
HDLC SERPL-MCNC: 47 MG/DL (ref 40–60)
HDLC SERPL: 5.2 (ref 0–5)
HGB BLD-MCNC: 15.9 G/DL (ref 13.6–17.2)
LDLC SERPL CALC-MCNC: 141 MG/DL (ref 0–100)
MCH RBC QN AUTO: 31.6 PG (ref 26.1–32.9)
MCHC RBC AUTO-ENTMCNC: 34.3 G/DL (ref 31.4–35)
MCV RBC AUTO: 92 FL (ref 82–102)
NRBC # BLD: 0 K/UL (ref 0–0.2)
PLATELET # BLD AUTO: 233 K/UL (ref 150–450)
PMV BLD AUTO: 9.3 FL (ref 9.4–12.3)
POTASSIUM SERPL-SCNC: 4.4 MMOL/L (ref 3.5–5.1)
PROT SERPL-MCNC: 7.1 G/DL (ref 6.3–8.2)
RBC # BLD AUTO: 5.03 M/UL (ref 4.23–5.6)
SODIUM SERPL-SCNC: 136 MMOL/L (ref 136–145)
TRIGL SERPL-MCNC: 276 MG/DL (ref 0–150)
VLDLC SERPL CALC-MCNC: 55 MG/DL (ref 6–23)
WBC # BLD AUTO: 5.4 K/UL (ref 4.3–11.1)

## 2024-08-30 LAB
TESTOST FREE SERPL-MCNC: 0.9 PG/ML (ref 6.6–18.1)
TESTOST SERPL-MCNC: 8 NG/DL (ref 264–916)

## 2025-02-05 ENCOUNTER — OFFICE VISIT (OUTPATIENT)
Dept: UROLOGY | Age: 60
End: 2025-02-05
Payer: COMMERCIAL

## 2025-02-05 DIAGNOSIS — N52.9 ERECTILE DYSFUNCTION, UNSPECIFIED ERECTILE DYSFUNCTION TYPE: ICD-10-CM

## 2025-02-05 DIAGNOSIS — E29.1 HYPOGONADISM IN MALE: ICD-10-CM

## 2025-02-05 DIAGNOSIS — N52.9 ERECTILE DYSFUNCTION, UNSPECIFIED ERECTILE DYSFUNCTION TYPE: Primary | ICD-10-CM

## 2025-02-05 LAB
ALBUMIN SERPL-MCNC: 4 G/DL (ref 3.2–4.6)
ALBUMIN/GLOB SERPL: 1.2 (ref 1–1.9)
ALP SERPL-CCNC: 61 U/L (ref 40–129)
ALT SERPL-CCNC: 31 U/L (ref 8–55)
AST SERPL-CCNC: 33 U/L (ref 15–37)
BILIRUB DIRECT SERPL-MCNC: <0.2 MG/DL (ref 0–0.4)
BILIRUB SERPL-MCNC: 0.6 MG/DL (ref 0–1.2)
ERYTHROCYTE [DISTWIDTH] IN BLOOD BY AUTOMATED COUNT: 13.2 % (ref 11.9–14.6)
GLOBULIN SER CALC-MCNC: 3.3 G/DL (ref 2.3–3.5)
HCT VFR BLD AUTO: 50.3 % (ref 41.1–50.3)
HGB BLD-MCNC: 17 G/DL (ref 13.6–17.2)
MCH RBC QN AUTO: 30.5 PG (ref 26.1–32.9)
MCHC RBC AUTO-ENTMCNC: 33.8 G/DL (ref 31.4–35)
MCV RBC AUTO: 90.1 FL (ref 82–102)
NRBC # BLD: 0 K/UL (ref 0–0.2)
PLATELET # BLD AUTO: 251 K/UL (ref 150–450)
PMV BLD AUTO: 9.3 FL (ref 9.4–12.3)
PROT SERPL-MCNC: 7.3 G/DL (ref 6.3–8.2)
PSA SERPL-MCNC: 3.4 NG/ML (ref 0–4)
RBC # BLD AUTO: 5.58 M/UL (ref 4.23–5.6)
WBC # BLD AUTO: 5.2 K/UL (ref 4.3–11.1)

## 2025-02-05 PROCEDURE — 99214 OFFICE O/P EST MOD 30 MIN: CPT | Performed by: NURSE PRACTITIONER

## 2025-02-05 RX ORDER — NEEDLES, DISPOSABLE 25GX5/8"
NEEDLE, DISPOSABLE MISCELLANEOUS
Qty: 100 EACH | Refills: 5 | Status: SHIPPED | OUTPATIENT
Start: 2025-02-05

## 2025-02-05 RX ORDER — TADALAFIL 20 MG/1
20 TABLET ORAL PRN
Qty: 30 TABLET | Refills: 5 | Status: CANCELLED | OUTPATIENT
Start: 2025-02-05

## 2025-02-05 RX ORDER — NEEDLES, SAFETY 18GX1"
NEEDLE, DISPOSABLE MISCELLANEOUS
Qty: 100 EACH | Refills: 5 | Status: SHIPPED | OUTPATIENT
Start: 2025-02-05

## 2025-02-05 RX ORDER — TESTOSTERONE CYPIONATE 200 MG/ML
200 VIAL (ML) INTRAMUSCULAR
Qty: 6 ML | Refills: 1 | Status: SHIPPED | OUTPATIENT
Start: 2025-02-05

## 2025-02-05 ASSESSMENT — ENCOUNTER SYMPTOMS
BACK PAIN: 0
NAUSEA: 0

## 2025-02-05 NOTE — PROGRESS NOTES
21 G (B-D HYPODERMIC NEEDLE 21GX1\") 21G X 1\" MISC Use as directed 100 each 5    Syringe/Needle, Disp, (EASYPOINT NEEDLE/SYRINGE) 18G X 1\" 3 ML MISC Use as directed 100 each 5    Testosterone Cypionate 200 MG/ML SOLN Inject 200 mg as directed every 14 days Max Daily Amount: 200 mg 6 mL 1    tadalafil (CIALIS) 20 MG tablet Take 1 tablet by mouth as needed for Erectile Dysfunction PRN 30 tablet 5    rosuvastatin (CRESTOR) 40 MG tablet Take 1 tablet by mouth daily 90 tablet 3    coenzyme Q10 100 MG CAPS capsule Take by mouth daily      Magnesium 400 MG TABS Take by mouth daily      ALPRAZolam (XANAX) 1 MG tablet Take 1 tablet by mouth nightly as needed for Sleep. PRN      aspirin 81 MG chewable tablet Take 1 tablet by mouth daily      Cholecalciferol (VITAMIN D) 10 MCG (400 UNIT) CAPS Capsule Take by mouth      fluticasone (FLONASE) 50 MCG/ACT nasal spray 2 sprays by Nasal route daily Prn      Menaquinone-7 (VITAMIN K2) 100 MCG CAPS Take by mouth daily      nitroGLYCERIN (NITROSTAT) 0.4 MG SL tablet Place 1 tablet under the tongue every 5 minutes as needed for Chest pain PRN      metoprolol succinate (TOPROL XL) 25 MG extended release tablet Take 12.5 mg by mouth daily (Patient not taking: Reported on 9/7/2022)       No current facility-administered medications for this visit.     Allergies   Allergen Reactions    Gadobutrol Hives and Itching     Pt. Broke out in hives,itching all over his body and throat feeling itchy and tight    Iodine Rash     Social History     Socioeconomic History    Marital status: Legally      Spouse name: Not on file    Number of children: Not on file    Years of education: Not on file    Highest education level: Not on file   Occupational History    Not on file   Tobacco Use    Smoking status: Never    Smokeless tobacco: Never   Substance and Sexual Activity    Alcohol use: Yes     Alcohol/week: 3.0 - 5.0 standard drinks of alcohol    Drug use: No     Frequency: 14.0 times per week

## 2025-02-07 LAB — TESTOST SERPL-MCNC: 339 NG/DL (ref 264–916)

## 2025-03-12 RX ORDER — TADALAFIL 20 MG/1
20 TABLET ORAL PRN
Qty: 30 TABLET | Refills: 5 | Status: SHIPPED | OUTPATIENT
Start: 2025-03-12

## 2025-04-16 ENCOUNTER — TELEPHONE (OUTPATIENT)
Dept: UROLOGY | Age: 60
End: 2025-04-16

## 2025-04-16 NOTE — TELEPHONE ENCOUNTER
Patient called a little upset that his billing is sent in as an ED code.  His insurance will not pay for that code.  He would like it billed as something else so his insurance will cover his visit

## 2025-08-11 DIAGNOSIS — E29.1 HYPOGONADISM IN MALE: ICD-10-CM

## 2025-08-11 RX ORDER — TESTOSTERONE CYPIONATE 200 MG/ML
200 VIAL (ML) INTRAMUSCULAR
Qty: 6 ML | Refills: 1 | Status: SHIPPED | OUTPATIENT
Start: 2025-08-11

## (undated) DEVICE — GLIDESHEATH SLENDER STAINLESS STEEL KIT: Brand: GLIDESHEATH SLENDER

## (undated) DEVICE — DEVICE COMPR REG 24 CM VASC BND

## (undated) DEVICE — GUIDEWIRE 035IN 210CM PTFE COAT FIX COR J TIP 15MM FIRM BODY

## (undated) DEVICE — CATHETER DIAG AD 5FR L110CM 145DEG COR GRY HYDRPHLC NYL

## (undated) DEVICE — RADIFOCUS OPTITORQUE ANGIOGRAPHIC CATHETER: Brand: OPTITORQUE